# Patient Record
Sex: FEMALE | Race: WHITE | Employment: OTHER | URBAN - METROPOLITAN AREA
[De-identification: names, ages, dates, MRNs, and addresses within clinical notes are randomized per-mention and may not be internally consistent; named-entity substitution may affect disease eponyms.]

---

## 2018-02-09 ENCOUNTER — HOSPITAL ENCOUNTER (INPATIENT)
Age: 78
LOS: 3 days | Discharge: HOME | DRG: 191 | End: 2018-02-12
Attending: EMERGENCY MEDICINE | Admitting: INTERNAL MEDICINE
Payer: MEDICARE

## 2018-02-09 ENCOUNTER — APPOINTMENT (OUTPATIENT)
Dept: CT IMAGING | Age: 78
DRG: 191 | End: 2018-02-09
Attending: INTERNAL MEDICINE
Payer: MEDICARE

## 2018-02-09 ENCOUNTER — APPOINTMENT (OUTPATIENT)
Dept: GENERAL RADIOLOGY | Age: 78
DRG: 191 | End: 2018-02-09
Attending: EMERGENCY MEDICINE
Payer: MEDICARE

## 2018-02-09 DIAGNOSIS — J96.01 ACUTE HYPOXEMIC RESPIRATORY FAILURE (HCC): Primary | ICD-10-CM

## 2018-02-09 DIAGNOSIS — J44.1 COPD EXACERBATION (HCC): ICD-10-CM

## 2018-02-09 PROBLEM — E87.0 HYPERNATREMIA: Status: ACTIVE | Noted: 2018-02-09

## 2018-02-09 PROBLEM — R09.02 HYPOXIA: Status: ACTIVE | Noted: 2018-02-09

## 2018-02-09 PROBLEM — Z72.0 TOBACCO ABUSE: Status: ACTIVE | Noted: 2018-02-09

## 2018-02-09 LAB
ALBUMIN SERPL-MCNC: 2.8 G/DL (ref 3.5–5)
ALBUMIN/GLOB SERPL: 0.9 {RATIO} (ref 1.1–2.2)
ALP SERPL-CCNC: 90 U/L (ref 45–117)
ALT SERPL-CCNC: 37 U/L (ref 12–78)
ANION GAP SERPL CALC-SCNC: 1 MMOL/L (ref 5–15)
AST SERPL-CCNC: 46 U/L (ref 15–37)
ATRIAL RATE: 67 BPM
BASOPHILS # BLD: 0 K/UL (ref 0–0.1)
BASOPHILS NFR BLD: 1 % (ref 0–1)
BILIRUB SERPL-MCNC: 0.5 MG/DL (ref 0.2–1)
BUN SERPL-MCNC: 15 MG/DL (ref 6–20)
BUN/CREAT SERPL: 19 (ref 12–20)
CALCIUM SERPL-MCNC: 8.4 MG/DL (ref 8.5–10.1)
CALCULATED P AXIS, ECG09: 69 DEGREES
CALCULATED R AXIS, ECG10: 70 DEGREES
CALCULATED T AXIS, ECG11: 67 DEGREES
CHLORIDE SERPL-SCNC: 107 MMOL/L (ref 97–108)
CO2 SERPL-SCNC: 38 MMOL/L (ref 21–32)
CREAT SERPL-MCNC: 0.81 MG/DL (ref 0.55–1.02)
DIAGNOSIS, 93000: NORMAL
DIFFERENTIAL METHOD BLD: ABNORMAL
EOSINOPHIL # BLD: 0 K/UL (ref 0–0.4)
EOSINOPHIL NFR BLD: 1 % (ref 0–7)
ERYTHROCYTE [DISTWIDTH] IN BLOOD BY AUTOMATED COUNT: 11.9 % (ref 11.5–14.5)
GLOBULIN SER CALC-MCNC: 3.1 G/DL (ref 2–4)
GLUCOSE SERPL-MCNC: 97 MG/DL (ref 65–100)
HCT VFR BLD AUTO: 44 % (ref 35–47)
HGB BLD-MCNC: 14.4 G/DL (ref 11.5–16)
IMM GRANULOCYTES # BLD: 0 K/UL
IMM GRANULOCYTES NFR BLD AUTO: 0 %
LYMPHOCYTES # BLD: 1 K/UL (ref 0.8–3.5)
LYMPHOCYTES NFR BLD: 24 % (ref 12–49)
MCH RBC QN AUTO: 33.2 PG (ref 26–34)
MCHC RBC AUTO-ENTMCNC: 32.7 G/DL (ref 30–36.5)
MCV RBC AUTO: 101.4 FL (ref 80–99)
MONOCYTES # BLD: 0.7 K/UL (ref 0–1)
MONOCYTES NFR BLD: 17 % (ref 5–13)
NEUTS BAND NFR BLD MANUAL: 2 % (ref 0–6)
NEUTS SEG # BLD: 2.5 K/UL (ref 1.8–8)
NEUTS SEG NFR BLD: 55 % (ref 32–75)
NRBC # BLD: 0 K/UL (ref 0–0.01)
NRBC BLD-RTO: 0 PER 100 WBC
P-R INTERVAL, ECG05: 138 MS
PLATELET # BLD AUTO: 144 K/UL (ref 150–400)
PMV BLD AUTO: 10.2 FL (ref 8.9–12.9)
POTASSIUM SERPL-SCNC: 3.5 MMOL/L (ref 3.5–5.1)
PROT SERPL-MCNC: 5.9 G/DL (ref 6.4–8.2)
Q-T INTERVAL, ECG07: 394 MS
QRS DURATION, ECG06: 78 MS
QTC CALCULATION (BEZET), ECG08: 416 MS
RBC # BLD AUTO: 4.34 M/UL (ref 3.8–5.2)
RBC MORPH BLD: ABNORMAL
SODIUM SERPL-SCNC: 146 MMOL/L (ref 136–145)
TROPONIN I SERPL-MCNC: <0.04 NG/ML
VENTRICULAR RATE, ECG03: 67 BPM
WBC # BLD AUTO: 4.2 K/UL (ref 3.6–11)

## 2018-02-09 PROCEDURE — 71275 CT ANGIOGRAPHY CHEST: CPT

## 2018-02-09 PROCEDURE — 94640 AIRWAY INHALATION TREATMENT: CPT

## 2018-02-09 PROCEDURE — 74011000250 HC RX REV CODE- 250: Performed by: EMERGENCY MEDICINE

## 2018-02-09 PROCEDURE — 36415 COLL VENOUS BLD VENIPUNCTURE: CPT | Performed by: EMERGENCY MEDICINE

## 2018-02-09 PROCEDURE — 94761 N-INVAS EAR/PLS OXIMETRY MLT: CPT

## 2018-02-09 PROCEDURE — 74011000250 HC RX REV CODE- 250: Performed by: INTERNAL MEDICINE

## 2018-02-09 PROCEDURE — 74011250637 HC RX REV CODE- 250/637: Performed by: INTERNAL MEDICINE

## 2018-02-09 PROCEDURE — 65270000029 HC RM PRIVATE

## 2018-02-09 PROCEDURE — 77030013140 HC MSK NEB VYRM -A

## 2018-02-09 PROCEDURE — 74011000258 HC RX REV CODE- 258: Performed by: INTERNAL MEDICINE

## 2018-02-09 PROCEDURE — 84484 ASSAY OF TROPONIN QUANT: CPT | Performed by: EMERGENCY MEDICINE

## 2018-02-09 PROCEDURE — 74011250636 HC RX REV CODE- 250/636: Performed by: INTERNAL MEDICINE

## 2018-02-09 PROCEDURE — 74011636320 HC RX REV CODE- 636/320: Performed by: RADIOLOGY

## 2018-02-09 PROCEDURE — 93005 ELECTROCARDIOGRAM TRACING: CPT

## 2018-02-09 PROCEDURE — A9270 NON-COVERED ITEM OR SERVICE: HCPCS | Performed by: EMERGENCY MEDICINE

## 2018-02-09 PROCEDURE — 80053 COMPREHEN METABOLIC PANEL: CPT | Performed by: EMERGENCY MEDICINE

## 2018-02-09 PROCEDURE — 85025 COMPLETE CBC W/AUTO DIFF WBC: CPT | Performed by: EMERGENCY MEDICINE

## 2018-02-09 PROCEDURE — 99285 EMERGENCY DEPT VISIT HI MDM: CPT

## 2018-02-09 PROCEDURE — 71046 X-RAY EXAM CHEST 2 VIEWS: CPT

## 2018-02-09 PROCEDURE — 74011636637 HC RX REV CODE- 636/637: Performed by: EMERGENCY MEDICINE

## 2018-02-09 RX ORDER — SODIUM CHLORIDE 0.9 % (FLUSH) 0.9 %
5-10 SYRINGE (ML) INJECTION EVERY 8 HOURS
Status: DISCONTINUED | OUTPATIENT
Start: 2018-02-09 | End: 2018-02-12 | Stop reason: HOSPADM

## 2018-02-09 RX ORDER — ALBUTEROL SULFATE 90 UG/1
2 AEROSOL, METERED RESPIRATORY (INHALATION)
COMMUNITY

## 2018-02-09 RX ORDER — DOXYCYCLINE 100 MG/1
100 CAPSULE ORAL 2 TIMES DAILY
COMMUNITY
End: 2018-02-09

## 2018-02-09 RX ORDER — ACETAMINOPHEN 325 MG/1
650 TABLET ORAL
Status: DISCONTINUED | OUTPATIENT
Start: 2018-02-09 | End: 2018-02-12 | Stop reason: HOSPADM

## 2018-02-09 RX ORDER — IBUPROFEN 200 MG
1 TABLET ORAL EVERY 24 HOURS
Status: DISCONTINUED | OUTPATIENT
Start: 2018-02-09 | End: 2018-02-12 | Stop reason: HOSPADM

## 2018-02-09 RX ORDER — SODIUM CHLORIDE 0.9 % (FLUSH) 0.9 %
5-10 SYRINGE (ML) INJECTION AS NEEDED
Status: DISCONTINUED | OUTPATIENT
Start: 2018-02-09 | End: 2018-02-12 | Stop reason: HOSPADM

## 2018-02-09 RX ORDER — ENOXAPARIN SODIUM 100 MG/ML
40 INJECTION SUBCUTANEOUS EVERY 24 HOURS
Status: DISCONTINUED | OUTPATIENT
Start: 2018-02-09 | End: 2018-02-12 | Stop reason: HOSPADM

## 2018-02-09 RX ORDER — ARFORMOTEROL TARTRATE 15 UG/2ML
15 SOLUTION RESPIRATORY (INHALATION)
Status: DISCONTINUED | OUTPATIENT
Start: 2018-02-09 | End: 2018-02-12 | Stop reason: HOSPADM

## 2018-02-09 RX ORDER — LEVOFLOXACIN 500 MG/1
500 TABLET, FILM COATED ORAL ONCE
Status: COMPLETED | OUTPATIENT
Start: 2018-02-09 | End: 2018-02-09

## 2018-02-09 RX ORDER — ALBUTEROL SULFATE 0.83 MG/ML
2.5 SOLUTION RESPIRATORY (INHALATION)
Status: DISCONTINUED | OUTPATIENT
Start: 2018-02-09 | End: 2018-02-12 | Stop reason: HOSPADM

## 2018-02-09 RX ORDER — DEXTROSE MONOHYDRATE AND SODIUM CHLORIDE 5; .45 G/100ML; G/100ML
75 INJECTION, SOLUTION INTRAVENOUS CONTINUOUS
Status: DISCONTINUED | OUTPATIENT
Start: 2018-02-09 | End: 2018-02-10

## 2018-02-09 RX ORDER — DOXYCYCLINE 100 MG/1
100 TABLET ORAL 2 TIMES DAILY
COMMUNITY
End: 2018-02-12

## 2018-02-09 RX ORDER — ONDANSETRON 2 MG/ML
4 INJECTION INTRAMUSCULAR; INTRAVENOUS
Status: DISCONTINUED | OUTPATIENT
Start: 2018-02-09 | End: 2018-02-12 | Stop reason: HOSPADM

## 2018-02-09 RX ORDER — PREDNISONE 20 MG/1
40 TABLET ORAL
Status: COMPLETED | OUTPATIENT
Start: 2018-02-09 | End: 2018-02-09

## 2018-02-09 RX ORDER — BENZONATATE 100 MG/1
100 CAPSULE ORAL
COMMUNITY
End: 2019-03-21

## 2018-02-09 RX ORDER — BUDESONIDE 0.5 MG/2ML
500 INHALANT ORAL
Status: DISCONTINUED | OUTPATIENT
Start: 2018-02-09 | End: 2018-02-12 | Stop reason: HOSPADM

## 2018-02-09 RX ORDER — IPRATROPIUM BROMIDE AND ALBUTEROL SULFATE 2.5; .5 MG/3ML; MG/3ML
3 SOLUTION RESPIRATORY (INHALATION)
Status: DISCONTINUED | OUTPATIENT
Start: 2018-02-09 | End: 2018-02-12 | Stop reason: HOSPADM

## 2018-02-09 RX ORDER — LEVOFLOXACIN 250 MG/1
250 TABLET ORAL EVERY 24 HOURS
Status: DISCONTINUED | OUTPATIENT
Start: 2018-02-10 | End: 2018-02-12 | Stop reason: HOSPADM

## 2018-02-09 RX ADMIN — BUDESONIDE 500 MCG: 0.5 INHALANT RESPIRATORY (INHALATION) at 20:55

## 2018-02-09 RX ADMIN — ENOXAPARIN SODIUM 40 MG: 40 INJECTION SUBCUTANEOUS at 21:00

## 2018-02-09 RX ADMIN — IOPAMIDOL 65 ML: 755 INJECTION, SOLUTION INTRAVENOUS at 19:10

## 2018-02-09 RX ADMIN — ALBUTEROL SULFATE 1 DOSE: 2.5 SOLUTION RESPIRATORY (INHALATION) at 17:31

## 2018-02-09 RX ADMIN — ALBUTEROL SULFATE 1 DOSE: 2.5 SOLUTION RESPIRATORY (INHALATION) at 16:03

## 2018-02-09 RX ADMIN — LEVOFLOXACIN 500 MG: 500 TABLET, FILM COATED ORAL at 21:02

## 2018-02-09 RX ADMIN — ALBUTEROL SULFATE 1 DOSE: 2.5 SOLUTION RESPIRATORY (INHALATION) at 16:21

## 2018-02-09 RX ADMIN — METHYLPREDNISOLONE SODIUM SUCCINATE 60 MG: 125 INJECTION, POWDER, FOR SOLUTION INTRAMUSCULAR; INTRAVENOUS at 20:28

## 2018-02-09 RX ADMIN — PREDNISONE 40 MG: 20 TABLET ORAL at 16:03

## 2018-02-09 RX ADMIN — DEXTROSE MONOHYDRATE AND SODIUM CHLORIDE 75 ML/HR: 5; .45 INJECTION, SOLUTION INTRAVENOUS at 20:42

## 2018-02-09 RX ADMIN — ACETAMINOPHEN 650 MG: 325 TABLET ORAL at 22:46

## 2018-02-09 RX ADMIN — Medication 10 ML: at 22:00

## 2018-02-09 RX ADMIN — IPRATROPIUM BROMIDE AND ALBUTEROL SULFATE 3 ML: .5; 3 SOLUTION RESPIRATORY (INHALATION) at 20:55

## 2018-02-09 NOTE — H&P
Admission History and Physical      NAME:  Angeles Miramontes   :   1940   MRN:  [id number]     PCP:  Nataly Almanza MD     Date/Time:  2018           Assessment/Plan:       COPD exacerbation ([facility] (2018):  Diagnosed by PCP years ago, but has not need to use nebulizer at home. Likely exacerbation but not significantly improved with steroids, doxy, and nebs this week. With metabolic alkalosis suspect chronic CO2 retention. -- IV solumedrol   -- scheduled and prn nebs   -- add LABA/ICS   -- check respiratory viral panel   -- add levaquin for possible bronchitis    Hypoxia (2018): Due to above. However, was 96% on RA on Monday at [facility] and now worse despite treatment. -- wean oxygen as tolerated   -- check CTA chest    Hypernatremia (2018):  Likely from mild volume depletion. -- IVF with D51/2NS   -- repeat labs in AM    AST elevation:  No prior labs for comparison. -- repeat in AM    Thrombocytopenia:  Mild. -- repeat in AM    Tobacco abuse (2018):  Counseled patient on importance of tobacco cessation. Approximately 3 min spent in this discussion alone. -- nicotine patch while inpatient           Subjective:     CHIEF COMPLAINT:  Cough. HISTORY OF PRESENT ILLNESS:     Ms. Sarah Park is a 65 y.o.  female who is admitted with COPD exacerbation ([facility] Ms. Sarah Park presented to the Emergency Department today complaining of cough and generally feeling ill. Over this past weekend had cough and ill feeling. Was seen at [facility] and diagnosed with COPD exacerbation. Given nebs, doxycycline, and tessalon. However, has not had improvement. Has nonproductive cough. Sob with activity. No fever, chills. No n/v. No diarrhea. Not improved with neb use at home. A bit better after neb in ED. Went back to [facility] today and found to be hypoxic and sent to ED.       Past Medical History:   Diagnosis Date    Chronic obstructive pulmonary disease ([facility] History reviewed. No pertinent surgical history. Social History   Substance Use Topics    Smoking status: Current Every Day Smoker    Smokeless tobacco: Not on file    Alcohol use No        Family History   Problem Relation Age of Onset    Heart Disease Brother         Allergies   Allergen Reactions    Latex Hives    Pcn [Penicillins] Hives        Prior to Admission medications    Medication Sig Start Date End Date Taking? Authorizing Provider   benzonatate (TESSALON PERLES) 100 mg capsule Take 100 mg by mouth three (3) times daily as needed for Cough. Yes Phys Other, MD   albuterol (VENTOLIN HFA) 90 mcg/actuation inhaler Take 2 Puffs by inhalation every four (4) hours as needed for Wheezing. Yes Phys Other, MD   doxycycline (ADOXA) 100 mg tablet Take 100 mg by mouth two (2) times a day.    Yes Historical Provider         Review of Systems:  (bold if positive, if negative)    Gen:  fatigueEyes:  ENT:  CVS:  Pulm:  CoughdyspneaGI:    :    MS:  Skin:  Endo:    Hem:  Renal:    Neuro:            Objective:      VITALS:    Vital signs reviewed; most recent are:    Visit Vitals    /45    Pulse 99    Temp 98.7 °F (37.1 °C)    Resp 25    Ht 5' 1.5\" (1.562 m)    Wt 47.2 kg (104 lb)    SpO2 (!) 86%    BMI 19.33 kg/m2     SpO2 Readings from Last 6 Encounters:   02/09/18 (!) 86%    O2 Flow Rate (L/min): 2 l/min   No intake or output data in the 24 hours ending 02/09/18 1808         Exam:     Physical Exam:    Gen:  thin, in no acute distress  HEENT:  Pink conjunctivae, PERRL, hearing intact to voice, moist mucous membranes  Neck:  Supple  Resp:  No accessory muscle use, diminished bs b/l staci at bases, scant exp wheezes  Card:  No murmurs, normal S1, S2 without thrills or peripheral edema  Abd:  Soft, non-tender, non-distended, normoactive bowel sounds are present  Lymph:  No cervical adenopathy  Musc:  No cyanosis  Skin:  No rashes or ulcers, skin turgor is decreased  Neuro:  Cranial nerves 3-12 are grossly intact,  strength is 5/5 bilaterally, dorsi / plantarflexion strength is 5/5 bilaterally, follows commands appropriately  Psych:  Alert with good insight. Oriented to person, place, and time       Labs:    Recent Labs      02/09/18   1610   WBC  4.2   HGB  14.4   HCT  44.0   PLT  144*     Recent Labs      02/09/18   1610   NA  146*   K  3.5   CL  107   CO2  38*   GLU  97   BUN  15   CREA  0.81   CA  8.4*   ALB  2.8*   TBILI  0.5   SGOT  46*   ALT  37     No results found for: GLUCPOC  No results for input(s): PH, PCO2, PO2, HCO3, FIO2 in the last 72 hours. No results for input(s): INR in the last 72 hours. No lab exists for component: INREXT    Chest Xray:  No acute process.   EKG reviewed:   Initial EKG: No acute changes suggestive of ischemia    Surrogate decision maker:  spouse    Total time spent with patient: [address] [facility] discussed with: Patient and Family    Discussed:  Care Plan    Prophylaxis:  Lovenox    Probable Disposition:  Home w/Family           ___________________________________________________    Attending Physician: Andrew Rees MD

## 2018-02-09 NOTE — ED TRIAGE NOTES
Monday started feeling bad, shortness of breath. [facility] and started on medications, didn't feel she was significantly better today so went back to [facility] and 02 sat was 87% on room air. 02 applied and EMS called to bring her to ER. Saline lock left forearm. 02 @ 4L/NC by EMS. 96%Sp 02. Patient has COPD, congested cough. Denies febrile illness this week. 02 reduced to 2L/NC upon arrival here.

## 2018-02-09 NOTE — PROGRESS NOTES
[facility]: MED RECONCILIATION    Comments/Recommendations:    Patient alert and oriented for medication reconciliation.  Patient given prescriptions for albuterol inhaler, Tessalon pearls and doxycyline 100mg BID on Monday at [facility]. Patient on day 4/10 of doxycyline course.  Patient states she also takes a few different vitamins/supplements at home but cannot name them. She states she has not taken any of them since starting doxycyline.  Confirmed allergies and updated pharmacy. Medications added:     · none    Medications removed:    · Decadron    Medications adjusted:    · none    Information obtained from: patient, Rx query     Allergies: Latex and Pcn [penicillins]    Prior to Admission Medications:     Medication Documentation Review Audit       Reviewed by Cody Franklin (Pharmacist) on 02/09/18 at 3672         Medication Sig Documenting Provider Last Dose Status Taking? albuterol (VENTOLIN HFA) 90 mcg/actuation inhaler Take 2 Puffs by inhalation every four (4) hours as needed for Wheezing. Eligio Valiente MD 2/9/2018 AM Active Yes    benzonatate (TESSALON PERLES) 100 mg capsule Take 100 mg by mouth three (3) times daily as needed for Cough. Eligio Valiente MD 2/9/2018 AM Active Yes    doxycycline (ADOXA) 100 mg tablet Take 100 mg by mouth two (2) times a day. Historical Provider 2/9/2018 AM Active Yes             Med Note (CODY FRANKLIN Feb 9, 2018  5:52 PM): Started on 2/6/18, prescribed for 10 days, on day 4/10.                      Thank Sadia Galeana, PharmD   Contact: [phone]

## 2018-02-09 NOTE — PROGRESS NOTES
[facility] Dosing Services: Antimicrobial Stewardship Progress Note    Levofloxacin - automatic dosage adjustment per P&T renal protocol  Pharmacist reviewed antibiotic appropriateness for 65year old , female  for indication of upper respiratory infection  Day of Therapy: 1    Non-Kinetic Antimicrobial Dosing:   Assessment/Plan: MD ordered Levofloxacin 500 mg PO daily. Patient's renal functin is 42.7 mL/min. OLevofloxacin changed to 500 mg PO x 1 dose today, then Levofloxacin 250 mg PO every 24 hours beginning tomorrow, 2/10/2018. Serum Creatinine     Lab Results   Component Value Date/Time    Creatinine 0.81 02/09/2018 04:10 PM    Creatinine (POC) 1.2 02/02/2012 09:29 PM       Creatinine Clearance Estimated Creatinine Clearance: 42.7 mL/min (based on Cr of 0.81).      Temp   98.7 °F (37.1 °C)    WBC   Lab Results   Component Value Date/Time    WBC 4.2 02/09/2018 04:10 PM       H/H   Lab Results   Component Value Date/Time    HGB 14.4 02/09/2018 04:10 PM        Platelets   Lab Results   Component Value Date/Time    PLATELET 772 (L) 51/02/2701 04:10 PM        Jose Pulliam, Pharmacist

## 2018-02-09 NOTE — ED PROVIDER NOTES
HPI Comments: 65 y.o. female with past medical history significant for COPD and emphysema who presents from home with chief complaint of cough. Patient states onset of a persistent severe dry cough over the last week that has remained relatively consistent. Patient mentions accompanying shortness of breath and a loss of appetite. Patient admits she has lost 5-6 pounds within the last week since she is just not eating. Patient did have a protein drink earlier today but denies eating anything else. Patient states she has been using 2 puffs of albuterol around 3 times daily with minimal relief. Patient denies having a nebulizer machine at home or filling script for duoneb. Patient states hx of the present sx around 1 year ago when she was given an inhaler. Patient denies fever, vomiting, diarrhea, and receiving a flu shot this year. There are no other acute medical concerns at this time. Old Chart Review: Per note, patient was evaluated at [facility] on 02/06/18 for similar sx and was given Decadron 10 mg PO in the office. She had a negative chest x-ray in the office at the time. She was started on Doxycycline 100 mg, Duoneb, Tessalon Perles, and albuterol. She returned to the office today; however, she was sent to the ED for a low O2 saturation of 87%. Social hx: Tobacco Use: Yes (1/2 pack per day), Alcohol Use: No, Drug Use: No    PCP: David Salazar MD    Note written by Vern Castillo, as dictated by Monica Johnson DO 3:54 PM      The history is provided by the patient, medical records and the spouse. Past Medical History:   Diagnosis Date    Chronic obstructive pulmonary disease ([facility]        History reviewed. No pertinent surgical history. History reviewed. No pertinent family history. Social History     Social History    Marital status:      Spouse name: N/A    Number of children: N/A    Years of education: N/A     Occupational History    Not on file. Social History Main Topics    Smoking status: Current Every Day Smoker    Smokeless tobacco: Not on file    Alcohol use No    Drug use: No    Sexual activity: Not on file     Other Topics Concern    Not on file     Social History Narrative    No narrative on file         ALLERGIES: Latex and Pcn [penicillins]    Review of Systems   Constitutional: Positive for appetite change and unexpected weight change. Negative for fever. HENT: Positive for congestion. Respiratory: Positive for cough and shortness of breath. Gastrointestinal: Negative for diarrhea, nausea and vomiting. All other systems reviewed and are negative. Vitals:    02/09/18 1536 02/09/18 1545   BP: 149/57 133/60   Pulse: 66 72   Resp: 24 25   Temp: 98.7 °F (37.1 °C)    SpO2: 96% 95%   Weight: 47.2 kg (104 lb)    Height: 5' 1.5\" (1.562 m)             Physical Exam   Constitutional: No distress. Frail, thin, elderly. HENT:   Head: Normocephalic. Nose: Nose normal.   Mouth/Throat: Oropharynx is clear and moist.   Eyes: Conjunctivae are normal. Pupils are equal, round, and reactive to light. Neck: No tracheal deviation present. Cardiovascular: Normal rate, regular rhythm, normal heart sounds and intact distal pulses. Pulmonary/Chest: She is in respiratory distress (mild). She has no wheezes. She has no rales. Diminished diffusely   Abdominal: Soft. She exhibits no distension. There is no tenderness. There is no rebound and no guarding. Musculoskeletal: She exhibits no edema. Neurological: She is alert. Skin: Skin is warm and dry. She is not diaphoretic. Psychiatric: She has a normal mood and affect. [facility]      ED Course       Procedures    ED EKG interpretation:  Rhythm: normal sinus rhythm; and regular . Rate (approx.): 67; Axis: normal; ST/T wave: normal; No acute ST changes.   Note written by Vern Rodriguez, as dictated by Harsh Dubose DO 3:47 PM    5:26 PM  Reviewed the patient's chest x-ray. '    5:30 PM  Patient's oxygen saturation was 88% on room air. Will start 3rd nebulizer treatment. She will need to be admitted to the hospitalist service for acute hypoxic respiratory failure. CONSULT NOTE:  5:35 PM Gia Weston DO spoke with Dr. Sade Orantes, Consult for Hospitalist.  Discussed available diagnostic tests and clinical findings. Provider is in agreement with care plans as outlined. Provider will evaluate the patient for admission to the hospital.          Recent Results (from the past 12 hour(s))   EKG, 12 LEAD, INITIAL    Collection Time: 02/09/18  3:47 PM   Result Value Ref Range    Ventricular Rate 67 BPM    Atrial Rate 67 BPM    P-R Interval 138 ms    QRS Duration 78 ms    Q-T Interval 394 ms    QTC Calculation (Bezet) 416 ms    Calculated P Axis 69 degrees    Calculated R Axis 70 degrees    Calculated T Axis 67 degrees    Diagnosis       Normal sinus rhythm  Possible Left atrial enlargement  Borderline ECG  No previous ECGs available     CBC WITH AUTOMATED DIFF    Collection Time: 02/09/18  4:10 PM   Result Value Ref Range    WBC 4.2 3.6 - 11.0 K/uL    RBC 4.34 3.80 - 5.20 M/uL    HGB 14.4 11.5 - 16.0 g/dL    HCT 44.0 35.0 - 47.0 %    .4 (H) 80.0 - 99.0 FL    MCH 33.2 26.0 - 34.0 PG    MCHC 32.7 30.0 - 36.5 g/dL    RDW 11.9 11.5 - 14.5 %    PLATELET 209 (L) 887 - 400 K/uL    MPV 10.2 8.9 - 12.9 FL    NRBC 0.0 0  WBC    ABSOLUTE NRBC 0.00 0.00 - 0.01 K/uL    NEUTROPHILS 55 32 - 75 %    BAND NEUTROPHILS 2 0 - 6 %    LYMPHOCYTES 24 12 - 49 %    MONOCYTES 17 (H) 5 - 13 %    EOSINOPHILS 1 0 - 7 %    BASOPHILS 1 0 - 1 %    IMMATURE GRANULOCYTES 0 %    ABS. NEUTROPHILS 2.5 1.8 - 8.0 K/UL    ABS. LYMPHOCYTES 1.0 0.8 - 3.5 K/UL    ABS. MONOCYTES 0.7 0.0 - 1.0 K/UL    ABS. EOSINOPHILS 0.0 0.0 - 0.4 K/UL    ABS. BASOPHILS 0.0 0.0 - 0.1 K/UL    ABS. IMM.  GRANS. 0.0 K/UL    DF MANUAL      RBC COMMENTS NORMOCYTIC, NORMOCHROMIC     METABOLIC PANEL, COMPREHENSIVE    Collection Time: 02/09/18  4:10 PM   Result Value Ref Range    Sodium 146 (H) 136 - 145 mmol/L    Potassium 3.5 3.5 - 5.1 mmol/L    Chloride 107 97 - 108 mmol/L    CO2 38 (H) 21 - 32 mmol/L    Anion gap 1 (L) 5 - 15 mmol/L    Glucose 97 65 - 100 mg/dL    BUN 15 6 - 20 MG/DL    Creatinine 0.81 0.55 - 1.02 MG/DL    BUN/Creatinine ratio 19 12 - 20      GFR est AA >60 >60 ml/min/1.73m2    GFR est non-AA >60 >60 ml/min/1.73m2    Calcium 8.4 (L) 8.5 - 10.1 MG/DL    Bilirubin, total 0.5 0.2 - 1.0 MG/DL    ALT (SGPT) 37 12 - 78 U/L    AST (SGOT) 46 (H) 15 - 37 U/L    Alk.  phosphatase 90 45 - 117 U/L    Protein, total 5.9 (L) 6.4 - 8.2 g/dL    Albumin 2.8 (L) 3.5 - 5.0 g/dL    Globulin 3.1 2.0 - 4.0 g/dL    A-G Ratio 0.9 (L) 1.1 - 2.2     TROPONIN I    Collection Time: 02/09/18  4:10 PM   Result Value Ref Range    Troponin-I, Qt. <0.04 <0.05 ng/mL

## 2018-02-09 NOTE — ED NOTES
TRANSFER - OUT REPORT:    Verbal report given to Gisela Sahu RN (name) on Brenda Dumont  being transferred to 5th floor medical(unit) for routine progression of care       Report consisted of patients Situation, Background, Assessment and   Recommendations(SBAR). Information from the following report(s) SBAR, ED Summary and MAR was reviewed with the receiving nurse. Lines:   Peripheral IV 02/09/18 Right Antecubital (Active)   Site Assessment Clean, dry, & intact 2/9/2018  3:41 PM   Phlebitis Assessment 0 2/9/2018  3:41 PM   Infiltration Assessment 0 2/9/2018  3:41 PM   Dressing Status Clean, dry, & intact 2/9/2018  3:41 PM   Hub Color/Line Status Green 2/9/2018  3:41 PM   Alcohol Cap Used Yes 2/9/2018  3:41 PM        Opportunity for questions and clarification was provided.       Patient transported with:   O2 @ 2L/NC liters

## 2018-02-09 NOTE — IP AVS SNAPSHOT
[facility] 
 
 
 [address] [facility] [address] 1007 [facility] 
[phone] Patient: Jennifer Watters MRN: [id number] :1940 About your hospitalization You were admitted on:  2018 You last received care in the:  [facility]  MED SURG 2 You were discharged on:  2018 Why you were hospitalized Your primary diagnosis was:  Copd Exacerbation (Hcc) Your diagnoses also included: Tobacco Abuse, Hypoxia, Hypernatremia, Leukopenia, Hypokalemia, Thrombocytopenia (Hcc) Follow-up Information Follow up With Details Comments Contact Info Clarene Simmonds, MD Go on 2/15/2018 For hospital follow up appointment at [place] [address] [address] 1007 [facility] 
[phone] Discharge Orders None A check willian indicates which time of day the medication should be taken. My Medications START taking these medications Instructions Each Dose to Equal  
 Morning Noon Evening Bedtime  
 fluticasone-salmeterol 250-50 mcg/dose diskus inhaler Commonly known as:  ADVAIR DISKUS Your last dose was: Your next dose is: Take 1 Puff by inhalation every twelve (12) hours for 30 days. 1 Puff  
    
   
   
   
  
 levoFLOXacin 250 mg tablet Commonly known as:  Christina Lord Your last dose was: Your next dose is: Take 1 Tab by mouth every twenty-four (24) hours for 3 days. 250 mg  
    
   
   
   
  
 nicotine 14 mg/24 hr patch Commonly known as:  Fred Nguyen Your last dose was: Your next dose is:    
   
   
 1 Patch by TransDERmal route every twenty-four (24) hours for 30 days. 1 Patch  
    
   
   
   
  
 predniSONE 10 mg tablet Commonly known as:  Che Manning Your last dose was:     
   
Your next dose is:    
   
   
 Prednisone 10mg tabs: 4 tabs (40 mg) daily x 3 days, then 3 tabs (30 mg) daily x 3 days, then 2 tabs (20 mg) daily x 3 days, then 1 tab   (10 mg) daily until gone. Dispense 30 tabs CONTINUE taking these medications Instructions Each Dose to Equal  
 Morning Noon Evening Bedtime TESSALON PERLES 100 mg capsule Generic drug:  benzonatate Your last dose was: Your next dose is: Take 100 mg by mouth three (3) times daily as needed for Cough. 100 mg VENTOLIN HFA 90 mcg/actuation inhaler Generic drug:  albuterol Your last dose was: Your next dose is: Take 2 Puffs by inhalation every four (4) hours as needed for Wheezing. 2 Puff STOP taking these medications   
 doxycycline 100 mg tablet Commonly known as:  ADOXA Where to Get Your Medications Information on where to get these meds will be given to you by the nurse or doctor. ! Ask your nurse or doctor about these medications  
  fluticasone-salmeterol 250-50 mcg/dose diskus inhaler  
 levoFLOXacin 250 mg tablet  
 nicotine 14 mg/24 hr patch  
 predniSONE 10 mg tablet Discharge Instructions HOSPITALIST DISCHARGE INSTRUCTIONS 
 
NAME:             Dagoberto Nesbitt :  1940 MRN:  [id number] Date:     2018 ADMIT DATE: 2018 DISCHARGE DATE: 2018 PRINCIPAL ADMITTING DIAGNOSIS: 
[facility] DISCHARGE DIAGNOSES: 
Principal Problem: COPD exacerbation ([facility] (2018) Tobacco abuse (2018) Hypoxia (2018) Leukopenia (2/10/2018) Thrombocytopenia ([facility] (2/10/2018) MEDICATIONS: 
 
· It is important that medications are taken exactly as they are prescribed on the discharge medication instructions and keep them your  in the bottles provided by the pharmacist.  
· Keep a list of the medication names, dosages, and times to be taken at all times. · Do not take other medications without consulting your doctor. Recommended diet:  Regular Diet Recommended activity: Activity as tolerated Post discharge care: 
 
Notify follow up health care provider or return to the emergency department if you cannot get hold of your doctor if you feel worse or experience symptoms similar to those that brought you to hospital 
 
Follow-up Information Follow up With Details Comments Contact Info Case Champion MD Schedule an appointment as soon as possible for a visit for review [address] [place] [address] 201 70 [facility] 
[phone] Information obtained by : 
I understand that if any problems occur once I am at home I am to contact my physician and I understand and acknowledge receipt of the instructions indicated above. Physician's or R.N.'s Signature                                                                  Date/Time Patient or Representative Signature                                                          Date/Time Taykey Announcement We are excited to announce that we are making your provider's discharge notes available to you in Taykey. You will see these notes when they are completed and signed by the physician that discharged you from your recent hospital stay. If you have any questions or concerns about any information you see in Taykey, please call the Health Information Department where you were seen or reach out to your Primary Care Provider for more information about your plan of care. Introducing [facility]!    
 [address] introduces Taykey patient portal. Now you can access parts of your medical record, email your doctor's office, and request medication refills online. 1. In your internet browser, go to [email or web address] 2. Click on the First Time User? Click Here link in the Sign In box. You will see the New Member Sign Up page. 3. Enter your [employer] Access Code exactly as it appears below. You will not need to use this code after youve completed the sign-up process. If you do not sign up before the expiration date, you must request a new code. · [employer] Access Code: [email or web address] Expires: 5/13/2018 10:44 AM 
 
4. Enter the last four digits of your Social Security Number (xxxx) and Date of Birth (mm/dd/yyyy) as indicated and click Submit. You will be taken to the next sign-up page. 5. Create a [employer] ID. This will be your [employer] login ID and cannot be changed, so think of one that is secure and easy to remember. 6. Create a [employer] password. You can change your password at any time. 7. Enter your Password Reset Question and Answer. This can be used at a later time if you forget your password. 8. Enter your e-mail address. You will receive e-mail notification when new information is available in [address] 9. Click Sign Up. You can now view and download portions of your medical record. 10. Click the Download Summary menu link to download a portable copy of your medical information. If you have questions, please visit the Frequently Asked Questions section of the [employer] website. Remember, [employer] is NOT to be used for urgent needs. For medical emergencies, dial 911. Now available from your iPhone and Android! Providers Seen During Your Hospitalization Provider Specialty Primary office phone Christina Olvera DO Emergency Medicine [phone] Miguel Angel Hutchinson MD Internal Medicine [phone] Isabel Howe MD Internal Medicine [phone] Tammie Bryson MD Internal Medicine [phone] Your Primary Care Physician (PCP) Primary Care Physician Office Phone Office Fax [address] [phone] You are allergic to the following Allergen Reactions Latex Hives Pcn (Penicillins) Hives Recent Documentation Height Weight BMI OB Status Smoking Status 1.562 m 47.2 kg 19.33 kg/m2 Postmenopausal Current Every Day Smoker Emergency Contacts Name Discharge Info Relation Home Work Mobile [address] Dannielle MACK CAREGIVER [3] Other Relative [6] [phone] [facility] HOSPITAL DISCHARGE CAREGIVER [3] Spouse [3]   [phone] Patient Belongings The following personal items are in your possession at time of discharge: 
  Dental Appliances: Uppers, Lowers, With patient  Visual Aid: Glasses      Home Medications: None   Jewelry: Ring, Watch  Clothing: None    Other Valuables: Cell Phone, At bedside Please provide this summary of care documentation to your next provider. Signatures-by signing, you are acknowledging that this After Visit Summary has been reviewed with you and you have received a copy. Patient Signature:  ____________________________________________________________ Date:  ____________________________________________________________  
  
Marlee Larsen Provider Signature:  ____________________________________________________________ Date:  ____________________________________________________________

## 2018-02-10 PROBLEM — Z72.0 TOBACCO ABUSE: Chronic | Status: ACTIVE | Noted: 2018-02-09

## 2018-02-10 PROBLEM — D72.819 LEUKOPENIA: Status: ACTIVE | Noted: 2018-02-10

## 2018-02-10 PROBLEM — E87.6 HYPOKALEMIA: Status: ACTIVE | Noted: 2018-02-10

## 2018-02-10 PROBLEM — D69.6 THROMBOCYTOPENIA (HCC): Status: ACTIVE | Noted: 2018-02-10

## 2018-02-10 LAB
ALBUMIN SERPL-MCNC: 2.8 G/DL (ref 3.5–5)
ALBUMIN/GLOB SERPL: 0.8 {RATIO} (ref 1.1–2.2)
ALP SERPL-CCNC: 94 U/L (ref 45–117)
ALT SERPL-CCNC: 33 U/L (ref 12–78)
ANION GAP SERPL CALC-SCNC: 10 MMOL/L (ref 5–15)
AST SERPL-CCNC: 35 U/L (ref 15–37)
BILIRUB SERPL-MCNC: 0.3 MG/DL (ref 0.2–1)
BUN SERPL-MCNC: 17 MG/DL (ref 6–20)
BUN/CREAT SERPL: 16 (ref 12–20)
CALCIUM SERPL-MCNC: 8.2 MG/DL (ref 8.5–10.1)
CHLORIDE SERPL-SCNC: 105 MMOL/L (ref 97–108)
CO2 SERPL-SCNC: 30 MMOL/L (ref 21–32)
CREAT SERPL-MCNC: 1.09 MG/DL (ref 0.55–1.02)
ERYTHROCYTE [DISTWIDTH] IN BLOOD BY AUTOMATED COUNT: 12.1 % (ref 11.5–14.5)
GLOBULIN SER CALC-MCNC: 3.3 G/DL (ref 2–4)
GLUCOSE SERPL-MCNC: 334 MG/DL (ref 65–100)
HCT VFR BLD AUTO: 41.4 % (ref 35–47)
HGB BLD-MCNC: 13.8 G/DL (ref 11.5–16)
MAGNESIUM SERPL-MCNC: 1.8 MG/DL (ref 1.6–2.4)
MCH RBC QN AUTO: 33.8 PG (ref 26–34)
MCHC RBC AUTO-ENTMCNC: 33.3 G/DL (ref 30–36.5)
MCV RBC AUTO: 101.5 FL (ref 80–99)
NRBC # BLD: 0 K/UL (ref 0–0.01)
NRBC BLD-RTO: 0 PER 100 WBC
PLATELET # BLD AUTO: 145 K/UL (ref 150–400)
PMV BLD AUTO: 10.1 FL (ref 8.9–12.9)
POTASSIUM SERPL-SCNC: 3.4 MMOL/L (ref 3.5–5.1)
PROT SERPL-MCNC: 6.1 G/DL (ref 6.4–8.2)
RBC # BLD AUTO: 4.08 M/UL (ref 3.8–5.2)
SODIUM SERPL-SCNC: 145 MMOL/L (ref 136–145)
WBC # BLD AUTO: 1.6 K/UL (ref 3.6–11)

## 2018-02-10 PROCEDURE — 74011000250 HC RX REV CODE- 250: Performed by: INTERNAL MEDICINE

## 2018-02-10 PROCEDURE — 65270000029 HC RM PRIVATE

## 2018-02-10 PROCEDURE — 83735 ASSAY OF MAGNESIUM: CPT | Performed by: INTERNAL MEDICINE

## 2018-02-10 PROCEDURE — 85027 COMPLETE CBC AUTOMATED: CPT | Performed by: INTERNAL MEDICINE

## 2018-02-10 PROCEDURE — 74011250637 HC RX REV CODE- 250/637: Performed by: INTERNAL MEDICINE

## 2018-02-10 PROCEDURE — 74011250636 HC RX REV CODE- 250/636: Performed by: INTERNAL MEDICINE

## 2018-02-10 PROCEDURE — 87633 RESP VIRUS 12-25 TARGETS: CPT | Performed by: INTERNAL MEDICINE

## 2018-02-10 PROCEDURE — 94640 AIRWAY INHALATION TREATMENT: CPT

## 2018-02-10 PROCEDURE — 80053 COMPREHEN METABOLIC PANEL: CPT | Performed by: INTERNAL MEDICINE

## 2018-02-10 PROCEDURE — 77010033678 HC OXYGEN DAILY

## 2018-02-10 PROCEDURE — 36415 COLL VENOUS BLD VENIPUNCTURE: CPT | Performed by: INTERNAL MEDICINE

## 2018-02-10 RX ORDER — POTASSIUM CHLORIDE 750 MG/1
40 TABLET, FILM COATED, EXTENDED RELEASE ORAL EVERY 4 HOURS
Status: COMPLETED | OUTPATIENT
Start: 2018-02-10 | End: 2018-02-10

## 2018-02-10 RX ADMIN — METHYLPREDNISOLONE SODIUM SUCCINATE 60 MG: 125 INJECTION, POWDER, FOR SOLUTION INTRAMUSCULAR; INTRAVENOUS at 10:10

## 2018-02-10 RX ADMIN — METHYLPREDNISOLONE SODIUM SUCCINATE 60 MG: 125 INJECTION, POWDER, FOR SOLUTION INTRAMUSCULAR; INTRAVENOUS at 15:00

## 2018-02-10 RX ADMIN — Medication 10 ML: at 03:19

## 2018-02-10 RX ADMIN — METHYLPREDNISOLONE SODIUM SUCCINATE 60 MG: 125 INJECTION, POWDER, FOR SOLUTION INTRAMUSCULAR; INTRAVENOUS at 21:39

## 2018-02-10 RX ADMIN — LEVOFLOXACIN 250 MG: 250 TABLET, FILM COATED ORAL at 18:00

## 2018-02-10 RX ADMIN — IPRATROPIUM BROMIDE AND ALBUTEROL SULFATE 3 ML: .5; 3 SOLUTION RESPIRATORY (INHALATION) at 16:34

## 2018-02-10 RX ADMIN — Medication 10 ML: at 13:48

## 2018-02-10 RX ADMIN — ENOXAPARIN SODIUM 40 MG: 40 INJECTION SUBCUTANEOUS at 21:10

## 2018-02-10 RX ADMIN — POTASSIUM CHLORIDE 40 MEQ: 750 TABLET, EXTENDED RELEASE ORAL at 13:47

## 2018-02-10 RX ADMIN — IPRATROPIUM BROMIDE AND ALBUTEROL SULFATE 3 ML: .5; 3 SOLUTION RESPIRATORY (INHALATION) at 12:36

## 2018-02-10 RX ADMIN — POTASSIUM CHLORIDE 40 MEQ: 750 TABLET, EXTENDED RELEASE ORAL at 10:10

## 2018-02-10 RX ADMIN — BUDESONIDE 500 MCG: 0.5 INHALANT RESPIRATORY (INHALATION) at 09:15

## 2018-02-10 RX ADMIN — METHYLPREDNISOLONE SODIUM SUCCINATE 60 MG: 125 INJECTION, POWDER, FOR SOLUTION INTRAMUSCULAR; INTRAVENOUS at 03:01

## 2018-02-10 RX ADMIN — IPRATROPIUM BROMIDE AND ALBUTEROL SULFATE 3 ML: .5; 3 SOLUTION RESPIRATORY (INHALATION) at 20:53

## 2018-02-10 RX ADMIN — BUDESONIDE 500 MCG: 0.5 INHALANT RESPIRATORY (INHALATION) at 20:52

## 2018-02-10 RX ADMIN — Medication 10 ML: at 21:40

## 2018-02-10 RX ADMIN — IPRATROPIUM BROMIDE AND ALBUTEROL SULFATE 3 ML: .5; 3 SOLUTION RESPIRATORY (INHALATION) at 09:15

## 2018-02-10 NOTE — PROGRESS NOTES
Guilherme I[facility] [place] [address]  [address]  [phone]      Medical Progress Note      NAME: Mina Luevano   :  1940  MRM:  [id number]    Date/Time: 2/10/2018  9:14 AM         Subjective:     Chief Complaint:  SOB: mild this AM, improved with treatments overnight, constant, worse with exertion,     ROS:  (bold if positive, if negative)                SOB/GORE        Tolerating Diet          Objective:       Vitals:          Last 24hrs VS reviewed since prior progress note.  Most recent are:    Visit Vitals    /65 (BP 1 Location: Left arm, BP Patient Position: At rest)    Pulse 80    Temp 98 °F (36.7 °C)    Resp 21    Ht 5' 1.5\" (1.562 m)    Wt 47.2 kg (104 lb)    SpO2 94%    BMI 19.33 kg/m2     SpO2 Readings from Last 6 Encounters:   02/10/18 94%    O2 Flow Rate (L/min): 2.5 l/min   No intake or output data in the 24 hours ending 02/10/18 0914       Exam:     Physical Exam:    Gen:  Well-developed, thin, frail, elderly, in no acute distress  HEENT:  Pink conjunctivae, PERRL, hearing intact to voice, moist mucous membranes  Neck:  Supple, without masses, thyroid non-tender  Resp:  No accessory muscle use, diminished throughout, slightly prolonged expiratory phase, few wheezes and rhonchi  Card:  No murmurs, normal S1, S2 without thrills, bruits or peripheral edema  Abd:  Soft, non-tender, non-distended, normoactive bowel sounds are present, no palpable organomegaly and no detectable hernias  Lymph:  No cervical or inguinal adenopathy  Musc:  No cyanosis or clubbing  Skin:  No rashes or ulcers, skin turgor is good  Neuro:  Cranial nerves are grossly intact, no focal motor weakness, follows commands appropriately  Psych:  Good insight, oriented to person, place and time, alert       Medications Reviewed: (see below)    Lab Data Reviewed: (see below)    ______________________________________________________________________    Medications: Current Facility-Administered Medications   Medication Dose Route Frequency    potassium chloride SR (KLOR-CON 10) tablet 40 mEq  40 mEq Oral Q4H    sodium chloride (NS) flush 5-10 mL  5-10 mL IntraVENous Q8H    sodium chloride (NS) flush 5-10 mL  5-10 mL IntraVENous PRN    acetaminophen (TYLENOL) tablet 650 mg  650 mg Oral Q4H PRN    ondansetron (ZOFRAN) injection 4 mg  4 mg IntraVENous Q4H PRN    enoxaparin (LOVENOX) injection 40 mg  40 mg SubCUTAneous Q24H    nicotine (NICODERM CQ) 14 mg/24 hr patch 1 Patch  1 Patch TransDERmal Q24H    albuterol-ipratropium (DUO-NEB) 2.5 MG-0.5 MG/3 ML  3 mL Nebulization Q4HWA RT    albuterol (PROVENTIL VENTOLIN) nebulizer solution 2.5 mg  2.5 mg Nebulization Q2H PRN    methylPREDNISolone (PF) (SOLU-MEDROL) injection 60 mg  60 mg IntraVENous Q6H    budesonide (PULMICORT) 500 mcg/2 ml nebulizer suspension  500 mcg Nebulization BID RT    arformoterol (BROVANA) neb solution 15 mcg  15 mcg Nebulization BID RT    levoFLOXacin (LEVAQUIN) tablet 250 mg  250 mg Oral Q24H            Lab Review:     Recent Labs      02/10/18   0259  02/09/18   1610   WBC  1.6*  4.2   HGB  13.8  14.4   HCT  41.4  44.0   PLT  145*  144*     Recent Labs      02/10/18   0259  02/09/18   1610   NA  145  146*   K  3.4*  3.5   CL  105  107   CO2  30  38*   GLU  334*  97   BUN  17  15   CREA  1.09*  0.81   CA  8.2*  8.4*   MG  1.8   --    ALB  2.8*  2.8*   TBILI  0.3  0.5   SGOT  35  46*   ALT  33  37     No results found for: GLUCPOC  No results for input(s): PH, PCO2, PO2, HCO3, FIO2 in the last 72 hours. No results for input(s): INR in the last 72 hours.     No lab exists for component: INREXT  No results found for: SDES  No results found for: CULT         Assessment:     Principal Problem:    COPD exacerbation ([address] (2/9/2018)    Active Problems:    Tobacco abuse (2/9/2018)      Hypoxia (2/9/2018)      Hypernatremia (2/9/2018)      Leukopenia (2/10/2018)      Hypokalemia (2/10/2018) Thrombocytopenia ([facility] (2/10/2018)           Plan:     Principal Problem:    COPD exacerbation ([address] (2/9/2018)   - continue nebs, steroids, levofloxacin   - viral panel ordered but not done, I have reordered it as well as a sputum culture    Active Problems:    Tobacco abuse (2/9/2018)   - counseled on cessation      Hypoxia (2/9/2018)   - CTA visualized by me, no PE      Hypernatremia (2/9/2018)   - resolved on IV fluids      Leukopenia (2/10/2018)   - WBC down this AM, suspect underlying viral infection   - follow on steroids and antibiotics   - if drops further may need to consult Hem/Onc      Hypokalemia (2/10/2018)   - replete and follow      Thrombocytopenia ([facility] (2/10/2018)   - very mild, follow on enoxaparin      Total time spent with patient: 35 minutes                  Care Plan discussed with: Patient and Nursing Staff    Discussed:  Code Status, Care Plan and D/C Planning    Prophylaxis:  Lovenox    Disposition:  Home w/Family           ___________________________________________________    Attending Physician: Daniel Newell MD

## 2018-02-10 NOTE — PROGRESS NOTES
Bedside and Verbal shift change report given to Huy Ballard RN (oncoming nurse) by Marjorie Sheehan RN (offgoing nurse). Report included the following information SBAR, Kardex, MAR and Recent Results.

## 2018-02-11 PROBLEM — E87.6 HYPOKALEMIA: Status: RESOLVED | Noted: 2018-02-10 | Resolved: 2018-02-11

## 2018-02-11 PROBLEM — E87.0 HYPERNATREMIA: Status: RESOLVED | Noted: 2018-02-09 | Resolved: 2018-02-11

## 2018-02-11 LAB
ANION GAP SERPL CALC-SCNC: 7 MMOL/L (ref 5–15)
B PERT DNA SPEC QL NAA+PROBE: NOT DETECTED
BASOPHILS # BLD: 0 K/UL (ref 0–0.1)
BASOPHILS NFR BLD: 0 % (ref 0–1)
BUN SERPL-MCNC: 23 MG/DL (ref 6–20)
BUN/CREAT SERPL: 26 (ref 12–20)
C PNEUM DNA SPEC QL NAA+PROBE: NOT DETECTED
CALCIUM SERPL-MCNC: 9.5 MG/DL (ref 8.5–10.1)
CHLORIDE SERPL-SCNC: 109 MMOL/L (ref 97–108)
CO2 SERPL-SCNC: 28 MMOL/L (ref 21–32)
CREAT SERPL-MCNC: 0.9 MG/DL (ref 0.55–1.02)
DIFFERENTIAL METHOD BLD: ABNORMAL
EOSINOPHIL # BLD: 0 K/UL (ref 0–0.4)
EOSINOPHIL NFR BLD: 0 % (ref 0–7)
ERYTHROCYTE [DISTWIDTH] IN BLOOD BY AUTOMATED COUNT: 12.2 % (ref 11.5–14.5)
FLUAV H1 2009 PAND RNA SPEC QL NAA+PROBE: NOT DETECTED
FLUAV H1 RNA SPEC QL NAA+PROBE: NOT DETECTED
FLUAV H3 RNA SPEC QL NAA+PROBE: NOT DETECTED
FLUAV SUBTYP SPEC NAA+PROBE: NOT DETECTED
FLUBV RNA SPEC QL NAA+PROBE: NOT DETECTED
GLUCOSE SERPL-MCNC: 150 MG/DL (ref 65–100)
HADV DNA SPEC QL NAA+PROBE: NOT DETECTED
HCOV 229E RNA SPEC QL NAA+PROBE: NOT DETECTED
HCOV HKU1 RNA SPEC QL NAA+PROBE: NOT DETECTED
HCOV NL63 RNA SPEC QL NAA+PROBE: NOT DETECTED
HCOV OC43 RNA SPEC QL NAA+PROBE: NOT DETECTED
HCT VFR BLD AUTO: 40.3 % (ref 35–47)
HGB BLD-MCNC: 13.3 G/DL (ref 11.5–16)
HMPV RNA SPEC QL NAA+PROBE: NOT DETECTED
HPIV1 RNA SPEC QL NAA+PROBE: NOT DETECTED
HPIV2 RNA SPEC QL NAA+PROBE: NOT DETECTED
HPIV3 RNA SPEC QL NAA+PROBE: NOT DETECTED
HPIV4 RNA SPEC QL NAA+PROBE: NOT DETECTED
IMM GRANULOCYTES # BLD: 0.1 K/UL (ref 0–0.04)
IMM GRANULOCYTES NFR BLD AUTO: 1 % (ref 0–0.5)
LYMPHOCYTES # BLD: 0.4 K/UL (ref 0.8–3.5)
LYMPHOCYTES NFR BLD: 6 % (ref 12–49)
M PNEUMO DNA SPEC QL NAA+PROBE: NOT DETECTED
MAGNESIUM SERPL-MCNC: 2 MG/DL (ref 1.6–2.4)
MCH RBC QN AUTO: 33.4 PG (ref 26–34)
MCHC RBC AUTO-ENTMCNC: 33 G/DL (ref 30–36.5)
MCV RBC AUTO: 101.3 FL (ref 80–99)
MONOCYTES # BLD: 0.4 K/UL (ref 0–1)
MONOCYTES NFR BLD: 6 % (ref 5–13)
NEUTS SEG # BLD: 5.9 K/UL (ref 1.8–8)
NEUTS SEG NFR BLD: 88 % (ref 32–75)
NRBC # BLD: 0 K/UL (ref 0–0.01)
NRBC BLD-RTO: 0 PER 100 WBC
PHOSPHATE SERPL-MCNC: 3.8 MG/DL (ref 2.6–4.7)
PLATELET # BLD AUTO: 166 K/UL (ref 150–400)
PMV BLD AUTO: 10.3 FL (ref 8.9–12.9)
POTASSIUM SERPL-SCNC: 5.4 MMOL/L (ref 3.5–5.1)
RBC # BLD AUTO: 3.98 M/UL (ref 3.8–5.2)
RSV RNA SPEC QL NAA+PROBE: NOT DETECTED
RV+EV RNA SPEC QL NAA+PROBE: NOT DETECTED
SODIUM SERPL-SCNC: 144 MMOL/L (ref 136–145)
WBC # BLD AUTO: 6.8 K/UL (ref 3.6–11)

## 2018-02-11 PROCEDURE — 80048 BASIC METABOLIC PNL TOTAL CA: CPT | Performed by: INTERNAL MEDICINE

## 2018-02-11 PROCEDURE — 84100 ASSAY OF PHOSPHORUS: CPT | Performed by: INTERNAL MEDICINE

## 2018-02-11 PROCEDURE — 94640 AIRWAY INHALATION TREATMENT: CPT

## 2018-02-11 PROCEDURE — 74011250637 HC RX REV CODE- 250/637: Performed by: INTERNAL MEDICINE

## 2018-02-11 PROCEDURE — 77010033678 HC OXYGEN DAILY

## 2018-02-11 PROCEDURE — 97116 GAIT TRAINING THERAPY: CPT

## 2018-02-11 PROCEDURE — 85025 COMPLETE CBC W/AUTO DIFF WBC: CPT | Performed by: INTERNAL MEDICINE

## 2018-02-11 PROCEDURE — 74011000250 HC RX REV CODE- 250: Performed by: INTERNAL MEDICINE

## 2018-02-11 PROCEDURE — 65270000029 HC RM PRIVATE

## 2018-02-11 PROCEDURE — 74011250636 HC RX REV CODE- 250/636: Performed by: INTERNAL MEDICINE

## 2018-02-11 PROCEDURE — 97161 PT EVAL LOW COMPLEX 20 MIN: CPT

## 2018-02-11 PROCEDURE — 83735 ASSAY OF MAGNESIUM: CPT | Performed by: INTERNAL MEDICINE

## 2018-02-11 PROCEDURE — 36415 COLL VENOUS BLD VENIPUNCTURE: CPT | Performed by: INTERNAL MEDICINE

## 2018-02-11 RX ADMIN — BUDESONIDE 500 MCG: 0.5 INHALANT RESPIRATORY (INHALATION) at 20:03

## 2018-02-11 RX ADMIN — BUDESONIDE 500 MCG: 0.5 INHALANT RESPIRATORY (INHALATION) at 09:20

## 2018-02-11 RX ADMIN — ENOXAPARIN SODIUM 40 MG: 40 INJECTION SUBCUTANEOUS at 21:17

## 2018-02-11 RX ADMIN — IPRATROPIUM BROMIDE AND ALBUTEROL SULFATE 3 ML: .5; 3 SOLUTION RESPIRATORY (INHALATION) at 12:34

## 2018-02-11 RX ADMIN — METHYLPREDNISOLONE SODIUM SUCCINATE 60 MG: 125 INJECTION, POWDER, FOR SOLUTION INTRAMUSCULAR; INTRAVENOUS at 09:44

## 2018-02-11 RX ADMIN — METHYLPREDNISOLONE SODIUM SUCCINATE 60 MG: 125 INJECTION, POWDER, FOR SOLUTION INTRAMUSCULAR; INTRAVENOUS at 02:54

## 2018-02-11 RX ADMIN — METHYLPREDNISOLONE SODIUM SUCCINATE 60 MG: 125 INJECTION, POWDER, FOR SOLUTION INTRAMUSCULAR; INTRAVENOUS at 15:00

## 2018-02-11 RX ADMIN — Medication 10 ML: at 22:18

## 2018-02-11 RX ADMIN — IPRATROPIUM BROMIDE AND ALBUTEROL SULFATE 3 ML: .5; 3 SOLUTION RESPIRATORY (INHALATION) at 09:20

## 2018-02-11 RX ADMIN — IPRATROPIUM BROMIDE AND ALBUTEROL SULFATE 3 ML: .5; 3 SOLUTION RESPIRATORY (INHALATION) at 20:03

## 2018-02-11 RX ADMIN — LEVOFLOXACIN 250 MG: 250 TABLET, FILM COATED ORAL at 18:18

## 2018-02-11 RX ADMIN — Medication 10 ML: at 12:07

## 2018-02-11 RX ADMIN — METHYLPREDNISOLONE SODIUM SUCCINATE 60 MG: 125 INJECTION, POWDER, FOR SOLUTION INTRAMUSCULAR; INTRAVENOUS at 22:17

## 2018-02-11 RX ADMIN — IPRATROPIUM BROMIDE AND ALBUTEROL SULFATE 3 ML: .5; 3 SOLUTION RESPIRATORY (INHALATION) at 16:11

## 2018-02-11 RX ADMIN — Medication 10 ML: at 06:33

## 2018-02-11 NOTE — PROGRESS NOTES
[facility] [address]  [address], [place], [address] [facility]  [phone]      Medical Progress Note      NAME: Pj Villarreal   :  1940  MRM:  [id number]    Date/Time: 2018  10:19 AM          Subjective:     Chief Complaint:  SOB: mild this AM, improved with treatments overnight, constant, worse with exertion,     ROS:  (bold if positive, if negative)                SOB/GORE        Tolerating Diet          Objective:       Vitals:          Last 24hrs VS reviewed since prior progress note.  Most recent are:    Visit Vitals    /61 (BP 1 Location: Left arm, BP Patient Position: At rest)    Pulse 82    Temp 98 °F (36.7 °C)    Resp 18    Ht 5' 1.5\" (1.562 m)    Wt 47.2 kg (104 lb)    SpO2 95%    BMI 19.33 kg/m2     SpO2 Readings from Last 6 Encounters:   18 95%    O2 Flow Rate (L/min): 2 l/min   No intake or output data in the 24 hours ending 18 1019       Exam:     Physical Exam:    Gen:  Well-developed, thin, frail, elderly, in no acute distress  HEENT:  Pink conjunctivae, PERRL, hearing intact to voice, moist mucous membranes  Neck:  Supple, without masses, thyroid non-tender  Resp:  No accessory muscle use, diminished throughout, slightly prolonged expiratory phase, few wheezes and rhonchi  Card:  No murmurs, normal S1, S2 without thrills, bruits or peripheral edema  Abd:  Soft, non-tender, non-distended, normoactive bowel sounds are present, no palpable organomegaly and no detectable hernias  Lymph:  No cervical or inguinal adenopathy  Musc:  No cyanosis or clubbing  Skin:  No rashes or ulcers, skin turgor is good  Neuro:  Cranial nerves are grossly intact, no focal motor weakness, follows commands appropriately  Psych:  Good insight, oriented to person, place and time, alert       Medications Reviewed: (see below)    Lab Data Reviewed: (see below)    ______________________________________________________________________    Medications: Current Facility-Administered Medications   Medication Dose Route Frequency    sodium chloride (NS) flush 5-10 mL  5-10 mL IntraVENous Q8H    sodium chloride (NS) flush 5-10 mL  5-10 mL IntraVENous PRN    acetaminophen (TYLENOL) tablet 650 mg  650 mg Oral Q4H PRN    ondansetron (ZOFRAN) injection 4 mg  4 mg IntraVENous Q4H PRN    enoxaparin (LOVENOX) injection 40 mg  40 mg SubCUTAneous Q24H    nicotine (NICODERM CQ) 14 mg/24 hr patch 1 Patch  1 Patch TransDERmal Q24H    albuterol-ipratropium (DUO-NEB) 2.5 MG-0.5 MG/3 ML  3 mL Nebulization Q4HWA RT    albuterol (PROVENTIL VENTOLIN) nebulizer solution 2.5 mg  2.5 mg Nebulization Q2H PRN    methylPREDNISolone (PF) (SOLU-MEDROL) injection 60 mg  60 mg IntraVENous Q6H    budesonide (PULMICORT) 500 mcg/2 ml nebulizer suspension  500 mcg Nebulization BID RT    arformoterol (BROVANA) neb solution 15 mcg  15 mcg Nebulization BID RT    levoFLOXacin (LEVAQUIN) tablet 250 mg  250 mg Oral Q24H            Lab Review:     Recent Labs      02/11/18   0502  02/10/18   0259  02/09/18   1610   WBC  6.8  1.6*  4.2   HGB  13.3  13.8  14.4   HCT  40.3  41.4  44.0   PLT  166  145*  144*     Recent Labs      02/11/18   0502  02/10/18   0259  02/09/18   1610   NA  144  145  146*   K  5.4*  3.4*  3.5   CL  109*  105  107   CO2  28  30  38*   GLU  150*  334*  97   BUN  23*  17  15   CREA  0.90  1.09*  0.81   CA  9.5  8.2*  8.4*   MG  2.0  1.8   --    PHOS  3.8   --    --    ALB   --   2.8*  2.8*   TBILI   --   0.3  0.5   SGOT   --   35  46*   ALT   --   33  37     No results found for: GLUCPOC  No results for input(s): PH, PCO2, PO2, HCO3, FIO2 in the last 72 hours. No results for input(s): INR in the last 72 hours.     No lab exists for component: INREXT, INREXT  No results found for: SDES  No results found for: CULT         Assessment:     Principal Problem:    COPD exacerbation ([address] (2/9/2018)    Active Problems:    Tobacco abuse (2/9/2018)      Hypoxia (2/9/2018) Hypernatremia (2/9/2018)      Leukopenia (2/10/2018)      Hypokalemia (2/10/2018)      Thrombocytopenia ([address] (2/10/2018)           Plan:     Principal Problem:    COPD exacerbation ([facility] (2/9/2018)   - continue nebs, steroids, levofloxacin   - viral panel negative    Active Problems:    Tobacco abuse (2/9/2018)   - counseled on cessation      Hypoxia (2/9/2018)   - due to COPD, not on home oxygen   - wean oxygen as tolerated   - will need 6-min walk PTD      Leukopenia (2/10/2018)   - WBC down this AM, suspect underlying viral infection   - follow on steroids and antibiotics   - if drops further may need to consult Hem/Onc      Hypokalemia (2/10/2018)   - resolved      Thrombocytopenia ([facility] (2/10/2018)   - platelets normal today, follow on enoxaparin      Total time spent with patient: 25 minutes                  Care Plan discussed with: Patient and Nursing Staff    Discussed:  Code Status, Care Plan and D/C Planning    Prophylaxis:  Lovenox    Disposition:  Home w/Family           ___________________________________________________    Attending Physician: Olivia Neal MD

## 2018-02-11 NOTE — PROGRESS NOTES
Bedside and Verbal shift change report given to Bin Matthew RN (oncoming nurse) by Zheng Pleitez RN (offgoing nurse). Report included the following information Intake/Output, MAR, Accordion and Recent Results.

## 2018-02-12 VITALS
BODY MASS INDEX: 19.14 KG/M2 | HEART RATE: 79 BPM | RESPIRATION RATE: 16 BRPM | WEIGHT: 104 LBS | OXYGEN SATURATION: 93 % | TEMPERATURE: 97.8 F | DIASTOLIC BLOOD PRESSURE: 55 MMHG | SYSTOLIC BLOOD PRESSURE: 129 MMHG | HEIGHT: 62 IN

## 2018-02-12 LAB
ANION GAP SERPL CALC-SCNC: 6 MMOL/L (ref 5–15)
BUN SERPL-MCNC: 27 MG/DL (ref 6–20)
BUN/CREAT SERPL: 30 (ref 12–20)
CALCIUM SERPL-MCNC: 9.5 MG/DL (ref 8.5–10.1)
CHLORIDE SERPL-SCNC: 111 MMOL/L (ref 97–108)
CO2 SERPL-SCNC: 29 MMOL/L (ref 21–32)
CREAT SERPL-MCNC: 0.91 MG/DL (ref 0.55–1.02)
ERYTHROCYTE [DISTWIDTH] IN BLOOD BY AUTOMATED COUNT: 12.4 % (ref 11.5–14.5)
GLUCOSE SERPL-MCNC: 160 MG/DL (ref 65–100)
HCT VFR BLD AUTO: 40.6 % (ref 35–47)
HGB BLD-MCNC: 13.2 G/DL (ref 11.5–16)
MAGNESIUM SERPL-MCNC: 2.1 MG/DL (ref 1.6–2.4)
MCH RBC QN AUTO: 33.2 PG (ref 26–34)
MCHC RBC AUTO-ENTMCNC: 32.5 G/DL (ref 30–36.5)
MCV RBC AUTO: 102.3 FL (ref 80–99)
NRBC # BLD: 0 K/UL (ref 0–0.01)
NRBC BLD-RTO: 0 PER 100 WBC
PHOSPHATE SERPL-MCNC: 4.1 MG/DL (ref 2.6–4.7)
PLATELET # BLD AUTO: 174 K/UL (ref 150–400)
PMV BLD AUTO: 10.3 FL (ref 8.9–12.9)
POTASSIUM SERPL-SCNC: 4.9 MMOL/L (ref 3.5–5.1)
RBC # BLD AUTO: 3.97 M/UL (ref 3.8–5.2)
SODIUM SERPL-SCNC: 146 MMOL/L (ref 136–145)
WBC # BLD AUTO: 7.9 K/UL (ref 3.6–11)

## 2018-02-12 PROCEDURE — 94640 AIRWAY INHALATION TREATMENT: CPT

## 2018-02-12 PROCEDURE — 77010033678 HC OXYGEN DAILY

## 2018-02-12 PROCEDURE — 80048 BASIC METABOLIC PNL TOTAL CA: CPT | Performed by: INTERNAL MEDICINE

## 2018-02-12 PROCEDURE — 85027 COMPLETE CBC AUTOMATED: CPT | Performed by: INTERNAL MEDICINE

## 2018-02-12 PROCEDURE — 74011000250 HC RX REV CODE- 250: Performed by: INTERNAL MEDICINE

## 2018-02-12 PROCEDURE — 74011250636 HC RX REV CODE- 250/636: Performed by: INTERNAL MEDICINE

## 2018-02-12 PROCEDURE — 83735 ASSAY OF MAGNESIUM: CPT | Performed by: INTERNAL MEDICINE

## 2018-02-12 PROCEDURE — 84100 ASSAY OF PHOSPHORUS: CPT | Performed by: INTERNAL MEDICINE

## 2018-02-12 PROCEDURE — 36415 COLL VENOUS BLD VENIPUNCTURE: CPT | Performed by: INTERNAL MEDICINE

## 2018-02-12 RX ORDER — LEVOFLOXACIN 250 MG/1
250 TABLET ORAL EVERY 24 HOURS
Qty: 3 TAB | Refills: 0 | Status: SHIPPED | OUTPATIENT
Start: 2018-02-12 | End: 2018-02-15

## 2018-02-12 RX ORDER — NEBULIZER AND COMPRESSOR
1 EACH MISCELLANEOUS
Qty: 1 EACH | Refills: 0 | Status: SHIPPED | OUTPATIENT
Start: 2018-02-12 | End: 2018-02-12

## 2018-02-12 RX ORDER — FLUTICASONE PROPIONATE AND SALMETEROL 250; 50 UG/1; UG/1
1 POWDER RESPIRATORY (INHALATION) EVERY 12 HOURS
Qty: 60 EACH | Refills: 0 | Status: SHIPPED | OUTPATIENT
Start: 2018-02-12 | End: 2018-03-14

## 2018-02-12 RX ORDER — PREDNISONE 10 MG/1
TABLET ORAL
Qty: 30 TAB | Refills: 0 | Status: SHIPPED | OUTPATIENT
Start: 2018-02-12 | End: 2019-03-21

## 2018-02-12 RX ORDER — IBUPROFEN 200 MG
1 TABLET ORAL EVERY 24 HOURS
Qty: 30 PATCH | Refills: 0 | Status: SHIPPED | OUTPATIENT
Start: 2018-02-12 | End: 2018-03-14

## 2018-02-12 RX ADMIN — Medication 10 ML: at 06:01

## 2018-02-12 RX ADMIN — METHYLPREDNISOLONE SODIUM SUCCINATE 60 MG: 125 INJECTION, POWDER, FOR SOLUTION INTRAMUSCULAR; INTRAVENOUS at 08:14

## 2018-02-12 RX ADMIN — BUDESONIDE 500 MCG: 0.5 INHALANT RESPIRATORY (INHALATION) at 07:23

## 2018-02-12 RX ADMIN — IPRATROPIUM BROMIDE AND ALBUTEROL SULFATE 3 ML: .5; 3 SOLUTION RESPIRATORY (INHALATION) at 07:23

## 2018-02-12 RX ADMIN — METHYLPREDNISOLONE SODIUM SUCCINATE 60 MG: 125 INJECTION, POWDER, FOR SOLUTION INTRAMUSCULAR; INTRAVENOUS at 03:26

## 2018-02-12 RX ADMIN — IPRATROPIUM BROMIDE AND ALBUTEROL SULFATE 3 ML: .5; 3 SOLUTION RESPIRATORY (INHALATION) at 11:27

## 2018-02-12 NOTE — PROGRESS NOTES
Bedside and Verbal shift change report given to Ana Guzman RN (oncoming nurse) by Lance Clifton RN (offgoing nurse). Report included the following information Intake/Output, MAR, Accordion and Recent Results.

## 2018-02-12 NOTE — PROGRESS NOTES
Discharge instructions reviewed with the patient and her  and all questions answered. Peripheral IV removed. Patient's  at bedside who will drive her home.

## 2018-02-12 NOTE — DISCHARGE SUMMARY
Gu[facility] I[facility] [place] [address]  [address], [place], [address] [facility]  Tel: [phone]    Physician Discharge Summary    Patient ID:    Ni Mixon  Age:              65 y.o.    : 1940  MRN:             [id number]     PCP: eZ Espinoza MD     Admit date: 2018    Discharge date: 2018    Principal admission Diagnosis:   COPD exacerbation [facility])    Discharge Diagnoses:  Principal Problem:    COPD exacerbation ([address] (2018)    Hypoxia (2018)    Leukopenia (2/10/2018)    Thrombocytopenia ([address] (2/10/2018)    Tobacco abuse (2018)    Consults: None    Hospital Course:     Ms. Linette Garcia is a 65 y.o. admitted to [facility] and treated for the following:    COPD exacerbation ([address] (2018): now feels much better and stable. No wheezing. Continue a prednisone taper, Advair and complete levofloxacin. Viral panel negative     Hypoxia (2018): due to COPD, not on home oxygen. Had a 6 min oximetry which was borderline. She categorically said she would not take oxygen at home. Advised her to follow with PCP for review    Leukopenia (2/10/2018): due to viral infection. Improved. No further work up for now. Hypokalemia (2/10/2018): resolved     Thrombocytopenia ([address] (2/10/2018): stable. Out patient follow up    Tobacco abuse (2018): counseled on cessation.  Continue Nicotine patch    Discharge Exam:    Visit Vitals    /55 (BP 1 Location: Left arm, BP Patient Position: At rest)    Pulse 79    Temp 97.8 °F (36.6 °C)    Resp 16    Ht 5' 1.5\" (1.562 m)    Wt 47.2 kg (104 lb)    SpO2 93%    BMI 19.33 kg/m2      General: well looking and stable patient in no acute distress  Pulm: clear breath sounds without wheezes  Card: no murmurs, normal S1, S2 without thrills, bruits   Abd:    soft, non-tender, normoactive bowel sounds  Skin: no rashes or ulcers, skin turgor is good  Neuro: awake, alert and has a non focal Activity: Activity as tolerated    Diet: Regular Diet    Current Discharge Medication List      START taking these medications    Details   levoFLOXacin (LEVAQUIN) 250 mg tablet Take 1 Tab by mouth every twenty-four (24) hours for 3 days. Qty: 3 Tab, Refills: 0      nicotine (NICODERM CQ) 14 mg/24 hr patch 1 Patch by TransDERmal route every twenty-four (24) hours for 30 days. Qty: 30 Patch, Refills: 0      predniSONE (DELTASONE) 10 mg tablet Prednisone 10mg tabs:  4 tabs (40 mg) daily x 3 days, then  3 tabs (30 mg) daily x 3 days, then  2 tabs (20 mg) daily x 3 days, then  1 tab   (10 mg) daily until gone. Dispense 30 tabs  Qty: 30 Tab, Refills: 0      fluticasone-salmeterol (ADVAIR DISKUS) 250-50 mcg/dose diskus inhaler Take 1 Puff by inhalation every twelve (12) hours for 30 days. Qty: 60 Each, Refills: 0         CONTINUE these medications which have NOT CHANGED    Details   benzonatate (TESSALON PERLES) 100 mg capsule Take 100 mg by mouth three (3) times daily as needed for Cough. albuterol (VENTOLIN HFA) 90 mcg/actuation inhaler Take 2 Puffs by inhalation every four (4) hours as needed for Wheezing. STOP taking these medications       doxycycline (ADOXA) 100 mg tablet Comments:   Reason for Stopping: Follow-up Information     Follow up With Details Comments Contact Info    Leena Jauregui MD Schedule an appointment as soon as possible for a visit for review [place]  [phone]            Follow-up tests or labs: None    Discharge Condition: Stable    Disposition: home    Time taken to arrange discharge:  35 minutes.     Signed:  Lottie Apley, MD     [facility]  2/12/2018   8:07 AM

## 2018-02-12 NOTE — PROGRESS NOTES
I met with pt after discussing pt with attending. Pt resides with her  in [place]. Pt has declined home oxygen and is declining the one time hospital to home COPD visit. I am requesting Carroll Dozier with [facility] to meet with pt prior to discharge regarding their program.I explained to pt that we are trying to help prevent her from being readmitted in the future. Pt was anxious during our conversation. I informed pt about the 10 am discharge time and she stated in an irritable tone \"Aren't you expecting too much too fast this morning\"? \"Noone can make my  get here before he gets here. \"I then proposed a 11 am discharge time which pt did not like either. She stated \"again,my  will get here when he gets here. \"    I discussed pt with her nurse. At this time,there are no identified discharge needs as pt is declining services offered . Jj Lipscomb  Team B with Dr Lauren Clay  [phone]    Addendum-From a case management perspective,pt is okay for discharge.   jJ Lipscomb

## 2018-02-12 NOTE — PROGRESS NOTES
I remet with pt after she informed me that she was told she could get a new nebulizer as her mask didn't fit her correctly. Pt states she prefers the nebulizer with the pipe device. I explained to pt that insurance will on;y pay for one nebulizer. I explainedto pt that she could take a script with her to [facility] in case [id number] [facility] [facility]uffy does not have nebulizers. I notified attending regarding pt.'s request for nebulizer with pipe device instead of mask. He is working on script for pt. I notified attending. Thank you.   Fifi People  Team B with Dr Deidre Herrmann  [phone]

## 2018-02-12 NOTE — ROUTINE PROCESS
Bedside and Verbal shift change report given to Eugene Garcia RN (oncoming nurse) by Susan Tellez (offgoing nurse). Report included the following information Intake/Output, MAR, Accordion and Recent Results.

## 2018-02-12 NOTE — PROGRESS NOTES
In talking with pt.'s ,pt already has a nebulizer that she used once and put back in her closet. I discussed with pt.'s nurse and pt/ with RT.   Maury Verdugo  Team B with Dr Melanie Gutierrez  [phone]

## 2018-02-12 NOTE — PROGRESS NOTES
Problem: Mobility Impaired (Adult and Pediatric)  Goal: *Acute Goals and Plan of Care (Insert Text)  Physical Therapy Goals  Initiated 2/11/2018  1. Patient will move from supine to sit and sit to supine , scoot up and down and roll side to side in bed from flat supine without railings with independence within 7 day(s). 2.  Patient will transfer from bed to chair and chair to bed with modified independence using the least restrictive device within 7 day(s). 3.  Patient will perform sit <> stand with modified independence within 7 day(s). 4.  Patient will ambulate with modified independence for 75 feet with the least restrictive device within 7 day(s). 5.  Patient will ascend/descend 7 stairs with 1 handrail(s) and cane with modified independence within 7 day(s). physical Therapy EVALUATION  Patient: Rubén Adhikari (80 y.o. female)  Date: 2/11/2018  Primary Diagnosis: COPD exacerbation ([facility] [address]        Precautions:   Fall    ASSESSMENT : patient seen ~1530 this afternoon  Based on the objective data described below, the patient presents with admission 2 days ago for COPD exacerbation. Reports feeling some better since admission but not back to baseline. Patient little miffed PT interrupted her finishing dessert but explained importance of PT and OOB. On 1 L nasal cannula on arrival and tested amb on RA. Patient with long history of COPD but has not required home oxygen in past. Still smokes per chart. Patient very thin at just 104 pounds and 5'1\". She demonstrates mildly impaired coordination, several instances of path deviations with gait without device as well as occasional mis steps into scissoring pattern, overall decreased dynamic balance. She states she has never needed cane or home oxygen and seems slightly resistant to mention of either.  Explained relationship between long history of progressive disease COPD,activity tolerance staci with exacerbation, use of steroids, bone deminneralization, fall prevention otherwise possible fall and fractures. Softened patient by focusing on recommendation is for her welfare. Patient scored 19/28 on Tinetti Balance test and would be interesting to see how she fairs on [employer]. She may need these results to convince her it may be time to consider rollator for energy conservation, fall prevention and to increase rather than decrease activity level, as she always will have seat to rest with rollator especially with community mob. Patient did not give consent to PT to order rollator at this time. Wants to consider cane instead. As she improves will attempt Mensah Balance Test to further quantify balance deficits but at this time recommend trial with actual Rollator. Patient frail and work of breathing at rest then mild dyspneic w/activity. She reports she has been in bed essentially for last 8 days. PT will follow to mobilize, improve gait safety, clear for discharge to home with possible HHPT. Not sure if patient would consider pulmonary rehab -outpatient, that would be most beneficial staci convince smoking cessation. Documentation for home O2:     ROOM AIR    AT REST   O2 SATS  91 HR  100   ROOM AIR WITH ACTIVITY- bed mobility 02 SATS  89 HR  117   Room Air WITH ACTIVITY- 200' amb O2 SATS  88-89 HR  114   (1   )LITERS OF 02 PATIENT LEFT COMFORTABLY  SITTING/SUPINE 02 SATS  89 HR  105           Patient will benefit from skilled intervention to address the above impairments.   Patients rehabilitation potential is considered to be Fair  Factors which may influence rehabilitation potential include:   []         None noted  []         Mental ability/status  [x]         Medical condition  []         Home/family situation and support systems  [x]         Safety awareness  []         Pain tolerance/management  []         Other:      PLAN :  Recommendations and Planned Interventions:  [x]           Bed Mobility Training             []    Neuromuscular Re-Education  [x] Transfer Training                   []    Orthotic/Prosthetic Training  [x]           Gait Training                         []    Modalities  [x]           Therapeutic Exercises           []    Edema Management/Control  [x]           Therapeutic Activities            [x]    Patient and Family Training/Education  []           Other (comment):    Frequency/Duration: Patient will be followed by physical therapy  5 times a week to address goals. Discharge Recommendations: Home Health and Inpatient/Outpatient pulmonary rehab pending progress  Further Equipment Recommendations for Discharge: TBD Possibly rollator or another ADevice     SUBJECTIVE:   Patient stated I have a lot of stairs & I don't want that big thing in house.  Discussion about fall prevention    OBJECTIVE DATA SUMMARY:   HISTORY:    Past Medical History:   Diagnosis Date    Chronic obstructive pulmonary disease ([facility] [address]    History reviewed. No pertinent surgical history. Prior Level of Function/Home Situation:   Personal factors and/or comorbidities impacting plan of care:     Home Situation  Home Environment: Private residence  # Steps to Enter: 5  Rails to Enter: Yes  Wheelchair Ramp: No  One/Two Story Residence: Two story  # of Interior Steps: 7 (multiple short stair cases)  Interior Rails:  (single)  Lift Chair Available: No  Living Alone: No  Support Systems: Spouse/Significant Other/Partner  Patient Expects to be Discharged to[de-identified] Private residence  Current DME Used/Available at Home: Cane, straight    EXAMINATION/PRESENTATION/DECISION MAKING:   Critical Behavior:   alert and oriented x 4           Hearing:   Auditory  Auditory Impairment: None    Range Of Motion:  AROM: Generally decreased, functional           PROM: Generally decreased, functional           Strength:    Strength: Generally decreased, functional                    Tone & Sensation:   Tone: Normal              Sensation:  (NT)               Coordination:  Coordination: Grossly decreased, non-functional       Functional Mobility:  Bed Mobility:  Rolling: Modified independent  Supine to Sit: Modified independent  Sit to Supine: Modified independent  Scooting: Modified independent  Transfers:  Sit to Stand: Stand-by asssistance  Stand to Sit: Stand-by asssistance  Stand Pivot Transfers: Stand-by asssistance                    Balance:   Sitting: Intact  Standing: Impaired  Standing - Static: Fair  Standing - Dynamic : Poor  Ambulation/Gait Training:  Distance (ft): 200 Feet (ft)  Assistive Device: Gait belt  Ambulation - Level of Assistance: Minimal assistance        Gait Abnormalities: Path deviations;Scissoring        Base of Support: Narrowed     Stairs:  Number of Stairs Trained: 4  Stairs - Level of Assistance: Minimum assistance   Rail Use: Right     Functional Measure:  Tinetti test:    Sitting Balance: 1  Arises: 1  Attempts to Rise: 2  Immediate Standing Balance: 1  Standing Balance: 0  Nudged: 1  Eyes Closed: 1  Turn 360 Degrees - Continuous/Discontinuous: 0  Turn 360 Degrees - Steady/Unsteady: 0  Sitting Down: 1  Balance Score: 8  Indication of Gait: 1  R Step Length/Height: 1  L Step Length/Height: 1  R Foot Clearance: 1  L Foot Clearance: 1  Step Symmetry: 1  Step Continuity: 1  Path: 1  Trunk: 2  Walking Time: 1  Gait Score: 11  Total Score: 19       Tinetti Test and G-code impairment scale:  Percentage of Impairment CH    0%   CI    1-19% CJ    20-39% CK    40-59% CL    60-79% CM    80-99% CN     100%   Tinetti  Score 0-28 28 23-27 17-22 12-16 6-11 1-5 0       Tinetti Tool Score Risk of Falls  <19 = High Fall Risk  19-24 = Moderate Fall Risk  25-28 = Low Fall Risk  Tinetti ME. Performance-Oriented Assessment of Mobility Problems in Elderly Patients. [place] [address]; [id number].  (Scoring Description: PT Bulletin Feb. 10, 1993)    Older adults: Ivana Mejias et al, 2009; n = [address] [employer] elderly evaluated with ABC, JOSE, ADL, and IADL)  · Mean JOSE score for males aged 69-68 years = 26.21(3.40)  · Mean JOSE score for females age 69-68 years = 25.16(4.30)  · Mean JOSE score for males over 80 years = 23.29(6.02)  · Mean JOSE score for females over 80 years = 17.20(8.32)       G codes: In compliance with CMSs Claims Based Outcome Reporting, the following G-code set was chosen for this patient based on their primary functional limitation being treated: The outcome measure chosen to determine the severity of the functional limitation was the Tinetti Test with a score of 19/28 which was correlated with the impairment scale. ? Mobility - Walking and Moving Around:     - CURRENT STATUS: CJ - 20%-39% impaired, limited or restricted    - GOAL STATUS: CI - 1%-19% impaired, limited or restricted    - D/C STATUS:  ---------------To be determined---------------      Physical Therapy Evaluation Charge Determination   History Examination Presentation Decision-Making   MEDIUM  Complexity : 1-2 comorbidities / personal factors will impact the outcome/ POC  MEDIUM Complexity : 3 Standardized tests and measures addressing body structure, function, activity limitation and / or participation in recreation  MEDIUM Complexity : Evolving with changing characteristics  Other outcome measures Tinetti Test  MEDIUM      Based on the above components, the patient evaluation is determined to be of the following complexity level: MEDIUM    Pain:  Pain Scale 1: Numeric (0 - 10)  Pain Intensity 1: 0              Activity Tolerance:   fair  Please refer to the flowsheet for vital signs taken during this treatment. After treatment:   []         Patient left in no apparent distress sitting up in chair  [x]         Patient left in no apparent distress in bed  [x]         Call bell left within reach  [x]         Nursing notified  []         Caregiver present  []         Bed alarm activated    COMMUNICATION/EDUCATION:   The patients plan of care was discussed with: Registered Nurse.   [x]         Fall prevention education was provided and the patient/caregiver indicated understanding. [x]         Patient/family have participated as able in goal setting and plan of care. []         Patient/family agree to work toward stated goals and plan of care. []         Patient understands intent and goals of therapy, but is neutral about his/her participation. []         Patient is unable to participate in goal setting and plan of care.     Thank you for this referral.  Anton Wilde, PT   Time Calculation: 30 mins

## 2019-03-11 ENCOUNTER — OFFICE VISIT (OUTPATIENT)
Dept: NEUROLOGY | Age: 79
End: 2019-03-11

## 2019-03-11 VITALS
RESPIRATION RATE: 20 BRPM | BODY MASS INDEX: 18.58 KG/M2 | DIASTOLIC BLOOD PRESSURE: 68 MMHG | HEART RATE: 64 BPM | HEIGHT: 62 IN | SYSTOLIC BLOOD PRESSURE: 148 MMHG | WEIGHT: 101 LBS | OXYGEN SATURATION: 95 %

## 2019-03-11 DIAGNOSIS — R41.89 COGNITIVE DECLINE: ICD-10-CM

## 2019-03-11 DIAGNOSIS — R53.83 OTHER FATIGUE: ICD-10-CM

## 2019-03-11 DIAGNOSIS — R41.89 COGNITIVE DECLINE: Primary | ICD-10-CM

## 2019-03-11 RX ORDER — MAGNESIUM 30 MG
TABLET ORAL
COMMUNITY

## 2019-03-11 RX ORDER — ZOLPIDEM TARTRATE 5 MG/1
TABLET ORAL
Refills: 0 | COMMUNITY
Start: 2019-02-27

## 2019-03-11 NOTE — LETTER
3/11/2019 3:10 PM 
 
Patient:  Jillian Teague YOB: 1940 Date of Visit: 3/11/2019 Dear Art Katz MD 
[address] [place] [id number] VIA Facsimile: [phone] 
 : Thank you for referring Ms. Jillian Teague to me for evaluation/treatment. Below are the relevant portions of my assessment and plan of care. If you have questions, please do not hesitate to call me. I look forward to following Ms. Rafat Barrett along with you. Sincerely, Shane Richarsd MD

## 2019-03-11 NOTE — PROGRESS NOTES
NEUROLOGY NEW PATIENT OFFICE CONSULTATION      3/11/2019    RE: Donnice Osgood         1940      REFERRED BY:  Ling Wadsworth MD        CHIEF COMPLAINT:  This is Donnice Osgood is a 77 y.o. 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 HPI:     For the past 3 months, patient noted cognitive problem described as problem with computers and having problem doing newsletters, not recognizing keys in the keyboard and not processing, word finding difficulty, problem with names and faces, not driving, started to be hoarding, needs help with finances. 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 (-) tongue bite (-) incontinence    (-) hallucinations  (+) agitation  (-) loss of interest  (-) depression  Sleeping okay.   (+) generalized fatigue with increased daytime naps  (+) gait disturbance  (+) weakness  (+) stress with taking her brother    ROS   (-) fever  (-) rash  All other systems reviewed and are negative    Past Medical Hx  Past Medical History:   Diagnosis Date    Asthma     [facility]     COPD with asthma ([facility]     Headache     Musculoskeletal disorder    R hearing loss tried on hearing aid    Social Hx  Social History     Socioeconomic History    Marital status:      Spouse name: Not on file    Number of children: Not on file    Years of education: Not on file    Highest education level: Not on file   Tobacco Use    Smoking status: Current Every Day Smoker   Substance and Sexual Activity    Alcohol use: No    Drug use: No       Family Hx  Family History   Problem Relation Age of Onset    Heart Disease Brother     Heart Disease Father        ALLERGIES  Allergies   Allergen Reactions    Latex Hives    Pcn [Penicillins] Hives       CURRENT MEDS  Current Outpatient Medications   Medication Sig Dispense Refill    zolpidem (AMBIEN) 5 mg tablet TK 1 T PO HS PRN  0    magnesium 30 mg tab Take  by mouth.  cholecalciferol, vitamin D3, (VITAMIN D3 PO) Take  by mouth.  albuterol (VENTOLIN HFA) 90 mcg/actuation inhaler Take 2 Puffs by inhalation every four (4) hours as needed for Wheezing.  predniSONE (DELTASONE) 10 mg tablet Prednisone 10mg tabs:  4 tabs (40 mg) daily x 3 days, then  3 tabs (30 mg) daily x 3 days, then  2 tabs (20 mg) daily x 3 days, then  1 tab   (10 mg) daily until gone. Dispense 30 tabs 30 Tab 0    benzonatate (TESSALON PERLES) 100 mg capsule Take 100 mg by mouth three (3) times daily as needed for Cough. PREVIOUS WORKUP: (reviewed)  IMAGING:    CT Results (recent):  Results from Hospital Encounter encounter on 02/09/18   CTA CHEST W OR W WO CONT    Narrative EXAM:  CTA CHEST W OR W WO CONT  INDICATION:   hypoxia  COMPARISON: None. .  TECHNIQUE:   Precontrast  images were obtained to localize the volume for acquisition. Multislice helical CT arteriography was performed from the diaphragm to the  thoracic inlet during uneventful rapid bolus intravenous contrast  administration. Lung and soft tissue windows were generated. Coronal and  sagittal images were generated and 3D post processing consisting of coronal  maximum intensity images was performed. CT dose reduction was achieved through use of a standardized protocol tailored  for this examination and automatic exposure control for dose modulation. Heddie Escanaba FINDINGS:  CHEST:  Chest wall/thoracic inlet: Within normal limits. Thyroid: Within normal limits. Mediastinum/roger: Fluid and air within the esophagus suggesting reflux or  dysmotility. Heart/vessels: Within normal limits. Lungs/Pleura: Centrilobular emphysema. .  INCIDENTALLY IMAGED ABDOMEN:  TIPS present. Cholelithiasis   .   MSK:   Minimal levoscoliosis, possibly positional.  .    Impression IMPRESSION:   1. No visualized pulmonary embolus. 2. Centrilobular emphysema. 3. Incidental findings as above. MRI Results (recent):  Results from [place] encounter on 02/02/12   MRI BRAIN W AND W/O CONTRAST    Narrative **Final Report**       ICD Codes / Adm. Diagnosis: 388.2   / SUDDEN HEARING LOSS, UNSPECIFI    Examination:  MR BRAIN W AND WO CON  - [id number] - Feb 2 2012  9:31PM  Accession No:  [id number]  Reason:  sudden hearing loss, evaluate CPA/IAC mass      REPORT:  CLINICAL INDICATION: Hearing loss right side. Technical factors: Diffusion imaging, sagittal T1-weighted, axial   T1-weighted pre- Post 9 mL IV Magnevist T2 and FLAIR axial gradient-echo   temporal bones axial and coronal T1-weighted temporal bones pre- and   postcontrast.    No prior. There is prominent atrophy and white matter disease. Temporal bones do   appear unremarkable, diffusion imaging is negative. There is no enhancing   lesion shift or mass effect. IMPRESSION: Prominent atrophy and white matter disease. Signing/Reading Doctor: Ricardo Cooper ([id number])    Approved: Ricardo Cooper ([id number])  02/03/2012                                      IR Results (recent):  No results found for this or any previous visit. VAS/US Results (recent):  No results found for this or any previous visit.         LABS (reviewed)  Results for orders placed or performed during the hospital encounter of 02/09/18   RESPIRATORY PANEL,PCR,NASOPHARYNGEAL   Result Value Ref Range    Adenovirus NOT DETECTED NOTD      Coronavirus 229E NOT DETECTED NOTD      Coronavirus HKU1 NOT DETECTED NOTD      Coronavirus CVNL63 NOT DETECTED NOTD      Coronavirus OC43 NOT DETECTED NOTD      Metapneumovirus NOT DETECTED NOTD      Rhinovirus and Enterovirus NOT DETECTED NOTD      Influenza A NOT DETECTED NOTD      Influenza A, subtype H1 NOT DETECTED NOTD      Influenza A, subtype H3 NOT DETECTED NOTD INFLUENZA A H1N1 PCR NOT DETECTED NOTD      Influenza B NOT DETECTED NOTD      Parainfluenza 1 NOT DETECTED NOTD      Parainfluenza 2 NOT DETECTED NOTD      Parainfluenza 3 NOT DETECTED NOTD      Parainfluenza virus 4 NOT DETECTED NOTD      RSV by PCR NOT DETECTED NOTD      Bordetella pertussis - PCR NOT DETECTED NOTD      Chlamydophila pneumoniae DNA, QL, PCR NOT DETECTED NOTD      Mycoplasma pneumoniae DNA, QL, PCR NOT DETECTED NOTD     CBC WITH AUTOMATED DIFF   Result Value Ref Range    WBC 4.2 3.6 - 11.0 K/uL    RBC 4.34 3.80 - 5.20 M/uL    HGB 14.4 11.5 - 16.0 g/dL    HCT 44.0 35.0 - 47.0 %    .4 (H) 80.0 - 99.0 FL    MCH 33.2 26.0 - 34.0 PG    MCHC 32.7 30.0 - 36.5 g/dL    RDW 11.9 11.5 - 14.5 %    PLATELET 268 (L) 683 - 400 K/uL    MPV 10.2 8.9 - 12.9 FL    NRBC 0.0 0  WBC    ABSOLUTE NRBC 0.00 0.00 - 0.01 K/uL    NEUTROPHILS 55 32 - 75 %    BAND NEUTROPHILS 2 0 - 6 %    LYMPHOCYTES 24 12 - 49 %    MONOCYTES 17 (H) 5 - 13 %    EOSINOPHILS 1 0 - 7 %    BASOPHILS 1 0 - 1 %    IMMATURE GRANULOCYTES 0 %    ABS. NEUTROPHILS 2.5 1.8 - 8.0 K/UL    ABS. LYMPHOCYTES 1.0 0.8 - 3.5 K/UL    ABS. MONOCYTES 0.7 0.0 - 1.0 K/UL    ABS. EOSINOPHILS 0.0 0.0 - 0.4 K/UL    ABS. BASOPHILS 0.0 0.0 - 0.1 K/UL    ABS. IMM. GRANS. 0.0 K/UL    DF MANUAL      RBC COMMENTS NORMOCYTIC, NORMOCHROMIC     METABOLIC PANEL, COMPREHENSIVE   Result Value Ref Range    Sodium 146 (H) 136 - 145 mmol/L    Potassium 3.5 3.5 - 5.1 mmol/L    Chloride 107 97 - 108 mmol/L    CO2 38 (H) 21 - 32 mmol/L    Anion gap 1 (L) 5 - 15 mmol/L    Glucose 97 65 - 100 mg/dL    BUN 15 6 - 20 MG/DL    Creatinine 0.81 0.55 - 1.02 MG/DL    BUN/Creatinine ratio 19 12 - 20      GFR est AA >60 >60 ml/min/1.73m2    GFR est non-AA >60 >60 ml/min/1.73m2    Calcium 8.4 (L) 8.5 - 10.1 MG/DL    Bilirubin, total 0.5 0.2 - 1.0 MG/DL    ALT (SGPT) 37 12 - 78 U/L    AST (SGOT) 46 (H) 15 - 37 U/L    Alk.  phosphatase 90 45 - 117 U/L    Protein, total 5.9 (L) 6.4 - 8.2 g/dL    Albumin 2.8 (L) 3.5 - 5.0 g/dL    Globulin 3.1 2.0 - 4.0 g/dL    A-G Ratio 0.9 (L) 1.1 - 2.2     TROPONIN I   Result Value Ref Range    Troponin-I, Qt. <0.04 <3.27 ng/mL   METABOLIC PANEL, COMPREHENSIVE   Result Value Ref Range    Sodium 145 136 - 145 mmol/L    Potassium 3.4 (L) 3.5 - 5.1 mmol/L    Chloride 105 97 - 108 mmol/L    CO2 30 21 - 32 mmol/L    Anion gap 10 5 - 15 mmol/L    Glucose 334 (H) 65 - 100 mg/dL    BUN 17 6 - 20 MG/DL    Creatinine 1.09 (H) 0.55 - 1.02 MG/DL    BUN/Creatinine ratio 16 12 - 20      GFR est AA 59 (L) >60 ml/min/1.73m2    GFR est non-AA 49 (L) >60 ml/min/1.73m2    Calcium 8.2 (L) 8.5 - 10.1 MG/DL    Bilirubin, total 0.3 0.2 - 1.0 MG/DL    ALT (SGPT) 33 12 - 78 U/L    AST (SGOT) 35 15 - 37 U/L    Alk.  phosphatase 94 45 - 117 U/L    Protein, total 6.1 (L) 6.4 - 8.2 g/dL    Albumin 2.8 (L) 3.5 - 5.0 g/dL    Globulin 3.3 2.0 - 4.0 g/dL    A-G Ratio 0.8 (L) 1.1 - 2.2     MAGNESIUM   Result Value Ref Range    Magnesium 1.8 1.6 - 2.4 mg/dL   CBC W/O DIFF   Result Value Ref Range    WBC 1.6 (L) 3.6 - 11.0 K/uL    RBC 4.08 3.80 - 5.20 M/uL    HGB 13.8 11.5 - 16.0 g/dL    HCT 41.4 35.0 - 47.0 %    .5 (H) 80.0 - 99.0 FL    MCH 33.8 26.0 - 34.0 PG    MCHC 33.3 30.0 - 36.5 g/dL    RDW 12.1 11.5 - 14.5 %    PLATELET 939 (L) 468 - 400 K/uL    MPV 10.1 8.9 - 12.9 FL    NRBC 0.0 0  WBC    ABSOLUTE NRBC 0.00 0.00 - 0.01 K/uL   CBC WITH AUTOMATED DIFF   Result Value Ref Range    WBC 6.8 3.6 - 11.0 K/uL    RBC 3.98 3.80 - 5.20 M/uL    HGB 13.3 11.5 - 16.0 g/dL    HCT 40.3 35.0 - 47.0 %    .3 (H) 80.0 - 99.0 FL    MCH 33.4 26.0 - 34.0 PG    MCHC 33.0 30.0 - 36.5 g/dL    RDW 12.2 11.5 - 14.5 %    PLATELET 259 660 - 059 K/uL    MPV 10.3 8.9 - 12.9 FL    NRBC 0.0 0  WBC    ABSOLUTE NRBC 0.00 0.00 - 0.01 K/uL    NEUTROPHILS 88 (H) 32 - 75 %    LYMPHOCYTES 6 (L) 12 - 49 %    MONOCYTES 6 5 - 13 %    EOSINOPHILS 0 0 - 7 %    BASOPHILS 0 0 - 1 %    IMMATURE GRANULOCYTES 1 (H) 0.0 - 0.5 %    ABS. NEUTROPHILS 5.9 1.8 - 8.0 K/UL    ABS. LYMPHOCYTES 0.4 (L) 0.8 - 3.5 K/UL    ABS. MONOCYTES 0.4 0.0 - 1.0 K/UL    ABS. EOSINOPHILS 0.0 0.0 - 0.4 K/UL    ABS. BASOPHILS 0.0 0.0 - 0.1 K/UL    ABS. IMM.  GRANS. 0.1 (H) 0.00 - 0.04 K/UL    DF AUTOMATED     MAGNESIUM   Result Value Ref Range    Magnesium 2.0 1.6 - 2.4 mg/dL   METABOLIC PANEL, BASIC   Result Value Ref Range    Sodium 144 136 - 145 mmol/L    Potassium 5.4 (H) 3.5 - 5.1 mmol/L    Chloride 109 (H) 97 - 108 mmol/L    CO2 28 21 - 32 mmol/L    Anion gap 7 5 - 15 mmol/L    Glucose 150 (H) 65 - 100 mg/dL    BUN 23 (H) 6 - 20 MG/DL    Creatinine 0.90 0.55 - 1.02 MG/DL    BUN/Creatinine ratio 26 (H) 12 - 20      GFR est AA >60 >60 ml/min/1.73m2    GFR est non-AA >60 >60 ml/min/1.73m2    Calcium 9.5 8.5 - 10.1 MG/DL   PHOSPHORUS   Result Value Ref Range    Phosphorus 3.8 2.6 - 4.7 MG/DL   CBC W/O DIFF   Result Value Ref Range    WBC 7.9 3.6 - 11.0 K/uL    RBC 3.97 3.80 - 5.20 M/uL    HGB 13.2 11.5 - 16.0 g/dL    HCT 40.6 35.0 - 47.0 %    .3 (H) 80.0 - 99.0 FL    MCH 33.2 26.0 - 34.0 PG    MCHC 32.5 30.0 - 36.5 g/dL    RDW 12.4 11.5 - 14.5 %    PLATELET 216 698 - 082 K/uL    MPV 10.3 8.9 - 12.9 FL    NRBC 0.0 0  WBC    ABSOLUTE NRBC 0.00 0.00 - 0.01 K/uL   MAGNESIUM   Result Value Ref Range    Magnesium 2.1 1.6 - 2.4 mg/dL   METABOLIC PANEL, BASIC   Result Value Ref Range    Sodium 146 (H) 136 - 145 mmol/L    Potassium 4.9 3.5 - 5.1 mmol/L    Chloride 111 (H) 97 - 108 mmol/L    CO2 29 21 - 32 mmol/L    Anion gap 6 5 - 15 mmol/L    Glucose 160 (H) 65 - 100 mg/dL    BUN 27 (H) 6 - 20 MG/DL    Creatinine 0.91 0.55 - 1.02 MG/DL    BUN/Creatinine ratio 30 (H) 12 - 20      GFR est AA >60 >60 ml/min/1.73m2    GFR est non-AA 60 (L) >60 ml/min/1.73m2    Calcium 9.5 8.5 - 10.1 MG/DL   PHOSPHORUS   Result Value Ref Range    Phosphorus 4.1 2.6 - 4.7 MG/DL   EKG, 12 LEAD, INITIAL   Result Value Ref Range    Ventricular Rate 67 BPM    Atrial Rate 67 BPM    P-R Interval 138 ms    QRS Duration 78 ms    Q-T Interval 394 ms    QTC Calculation (Bezet) 416 ms    Calculated P Axis 69 degrees    Calculated R Axis 70 degrees    Calculated T Axis 67 degrees    Diagnosis       Normal sinus rhythm  Possible Left atrial enlargement  Borderline ECG  No previous ECGs available  Confirmed by Kirby Hoyt MD., Bridgett Malloy ([id number]) on 2/9/2018 9:47:56 PM         Physical Exam:     Visit Vitals  /68   Pulse 64   Resp 20   Ht 5' 1.5\" (1.562 m)   Wt 45.8 kg (101 lb)   SpO2 95%   BMI 18.77 kg/m²     General:  Alert, cooperative, no distress. Head:  Normocephalic, without obvious abnormality, atraumatic. Eyes:  Conjunctivae/corneas clear. Lungs:  Heart:   Non labored breathing  Regular rate and rhythm, no carotid bruits   Abdomen:   Soft, non-distended   Extremities: Extremities normal, atraumatic, no cyanosis or edema. Pulses: 2+ and symmetric all extremities. Skin: Skin color, texture, turgor normal. No rashes or lesions.   Neurologic Exam     Gen: MMSE 28/30 Attention normal  Oriented x 3             Language: naming, repetition, fluency normal             Memory: intact recent and remote memory  Problem with registration and copying  Cranial Nerves:  I: smell Not tested   II: visual fields Full to confrontation   II: pupils Equal, round, reactive to light   II: optic disc No papilledema   III,VII: ptosis none   III,IV,VI: extraocular muscles  Full ROM   V: mastication normal   V: facial light touch sensation  normal   VII: facial muscle function   symmetric   VIII: hearing symmetric   IX: soft palate elevation  normal   XI: trapezius strength  5/5   XI: sternocleidomastoid strength 5/5   XI: neck flexion strength  5/5   XII: tongue  midline     Motor: normal bulk and tone, no tremor              Strength: 5/5 all four extremities  Sensory: intact to LT, PP, vibration, and JPS  Reflexes: 2+ throughout; Down going toes  Coordination: Good FTN and HTS  Gait: slightly wide based gait with problem walking heel to toe           Impression:     Rian Carrillo is a 77 y.o. female who  has a past medical history of Asthma, Chronic obstructive pulmonary disease ([address] COPD with asthma ([address] Headache, and Musculoskeletal disorder. who for the past 3 months, noted cognitive decline described as problem with computers and having problem doing newsletters, not recognizing keys in the keyboard and not processing, word finding difficulty, problem with names and faces, not driving, started to be hoarding, needs help with finances. MMSE is 28/30. Patient should be evaluated for dementia. 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 (-) tongue bite (-) incontinence. Consideration includes seizure. Patient should be evaluated for structural, inflammatory and metabolic causes of her symptoms. RECOMMENDATIONS  1. I had a long discussion with patient,  and daughter-in-law. Discussed diagnosis, prognosis, pathophysiology and available treatment. All questions were answered. 2. MRI brain with SHEILA to evaluate for structural cause of seizure and dementia. Patient to call for results  3. Blood test for Vit B12, Folate, TSH, ESR, HAILEY  4. Sleep deprived EEG  5. Neuropsychological testing  6. Depending on above, will consider trial of Aricept    Follow-up Disposition:  Return for review of results.       Thank you for the consultation      Raf Arora MD  Diplomate, American Board of Psychiatry and Neurology  Diplomate, Neuromuscular Medicine  Diplomate, American Board of Electrodiagnostic Medicine        CC: Fiorella Woods MD  Fax: [phone]

## 2019-03-11 NOTE — PROGRESS NOTES
Usually when she wakes up in the morning with a headache her right eye will be droopy- per the patient she doesn't wake up with many headaches   She takes an anxiety medication that is supposed to be very natural- takes 2 of them every morning     If she feels like she has an headache she will take Advil- takes away most of the pain from the headache     The headaches are just something that started about 1.5 months ago     Memory issues- she has to think a lot bit harder on what she is trying to remember, not quite as alert as she used to be     Has been under abnormal amounts of stress in the last year and half   Her balance while ambulating is better some days than others, no falls  She is having to catch herself on things like furniture, its gets worse in the evenings when she gets tired       l

## 2019-03-12 ENCOUNTER — OFFICE VISIT (OUTPATIENT)
Dept: NEUROLOGY | Age: 79
End: 2019-03-12

## 2019-03-12 DIAGNOSIS — F10.21 HISTORY OF ALCOHOL DEPENDENCE (HCC): ICD-10-CM

## 2019-03-12 DIAGNOSIS — R41.89 DIFFICULTY PROCESSING INFORMATION: ICD-10-CM

## 2019-03-12 DIAGNOSIS — R47.89 WORD FINDING DIFFICULTY: ICD-10-CM

## 2019-03-12 DIAGNOSIS — R53.83 OTHER FATIGUE: ICD-10-CM

## 2019-03-12 DIAGNOSIS — R41.89 COGNITIVE DECLINE: ICD-10-CM

## 2019-03-12 DIAGNOSIS — F41.9 CHRONIC ANXIETY: ICD-10-CM

## 2019-03-12 DIAGNOSIS — Z63.79 STRESS DUE TO ILLNESS OF FAMILY MEMBER: ICD-10-CM

## 2019-03-12 DIAGNOSIS — G31.84 MILD COGNITIVE IMPAIRMENT: Primary | ICD-10-CM

## 2019-03-12 DIAGNOSIS — R41.840 ATTENTION DEFICIT: ICD-10-CM

## 2019-03-12 DIAGNOSIS — F42.3 HOARDING BEHAVIOR: ICD-10-CM

## 2019-03-12 NOTE — PROGRESS NOTES
[address] [facility],[address]  [address]   [place] [address] [place] [facility]   [address]   [phone] Office   [phone] Fax      Neuropsychology    Initial Diagnostic Interview Note      Referral:  Bri Camara MD, . Jonn Henderson is a 77 y.o. left handed   female who was accompanied by her spouse and daughter in law to the initial clinical interview on 3/12/19. Please refer to her medical records for details pertaining to her history. Briefly, the patient reported that she completed high school followed by Nursing School at [employer] without history of previously diagnosed LD and/or receipt of special education. She is a retired surgical nurse. She has noticed a progressive decline in short term memory. She forgets the content of conversations. Misplaces things. Starts tasks and does not complete. She is more easily distracted. 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 She has had problems including difficulties using computers, not recognizing the keys, not processing. She struggles with remembering names, and has difficulties recalling faces. She loses words in conversations. She has started to elma. She gets help with finances. She is no longer driving. [place] and had noticed her eyes rolling and she became unresponsive for several seconds and then returned to baseline. She was not bowel or bladder incontinent when this happened. Did not bite her tongue. She is describe as being more agitated at times. She is generally sleeping okay, but is much more fatigued. No background stroke, meningitis/encephalitis, MAU Fever, Lupus, Lyme, or TBI. She is more weak, and her gait and balance is off. Day-to-day stress is reported. She has no new medical issues or medication changes. She has no known family history of dementia.   Spouse has definitely noticed a decline. She has a lot of stress overload, which is dealing with their business, which is mentally demanding - they are in multi level marketing. Her long term memory is fine. About three years ago, her brother became medically disabled, lives in [place], and his mind is functioning but physically is not. He is reliant on two dysfunctional individual, and patient is very concerned about this and stressed about this. Brother's spouse  of OD, as did her child. Patient is driving 2-3 times a week to go over there. Stopped driving about a month ago due to physical weakness and burnout. She has frequent headaches on the right temporal lobe. Updated MRI has been ordered. Headaches ongoing for about a year. Has not noticed any changes in sense of taste or smell. She has lost a lot of weight and appetite is terrible. 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 No counseling or psychiatrist.  No current legal troubles. She has a history of hoarding x 15 years, and every room is used as a closet, home is 7000 square feet. Her closet is 20 feet long and full of things she won't use, and there is stuff everywhere. She admits it is out of control but has not yet been willing to have anyone come in and clean things out. They made some dents in this awhile back but is back to the way it was. Threw out 50 bags of things and now more has returned. Her father passed away when she was age 11. Had a hard time when her mother passed away. She is status post bypass through liver, and she had cirrhosis of liver. She was a closet drinker    MRI from :  RT:  CLINICAL INDICATION: Hearing loss right side.     Technical factors: Diffusion imaging, sagittal T1-weighted, axial   T1-weighted pre- Post 9 mL IV Magnevist T2 and FLAIR axial gradient-echo   temporal bones axial and coronal T1-weighted temporal bones pre- and postcontrast.    No prior. There is prominent atrophy and white matter disease. Temporal bones do   appear unremarkable, diffusion imaging is negative. There is no enhancing   lesion shift or mass effect.          IMPRESSION: Prominent atrophy and white matter disease.      A new MRI has been ordered and pending. She has not had neuropsych testing in the past,but did pass a psych evaluation when she was going through the transplant process 8-10 years ago. Neuropsychological Mental Status Exam (NMSE):  Historian: Good  Praxis: No UE apraxia  R/L Orientation: Intact to self and to other  Dress: within normal limits   Weight: within normal limits   Appearance/Hygiene: within normal limits   Gait: Slow, unassisted  Assistive Devices: None  Mood: within normal limits   Affect: within normal limits   Comprehension: within normal limits   Thought Process: logical and goal oriented but slow to process and gather her thoughts. Expressive Language: Slow rate of speech   Receptive Language: within normal limits , but deaf in right ear  Motor:  No cognitive or motor perseveration  ETOH: 1/2 glass of wine a night now. She was a closet drinker and has cirrhosis of liver, stent. Tobacco:  6 cigarettes or so a day, she has cut back, counseling to quit given  Illicit: Denied   SI/HI: Denied  Psychosis: Denied    Insight: Within normal limits  Judgment: Within normal limits  Other Psych:      Past Medical History:   Diagnosis Date    Asthma     Chronic obstructive pulmonary disease ([address]     COPD with asthma ([address]     Headache     Musculoskeletal disorder        History reviewed. No pertinent surgical history.     Allergies   Allergen Reactions    Latex Hives    Pcn [Penicillins] Hives       Family History   Problem Relation Age of Onset    Heart Disease Brother     Heart Disease Father        Social History     Tobacco Use    Smoking status: Current Every Day Smoker   Substance Use Topics    Alcohol use: No    Drug use: No       Current Outpatient Medications   Medication Sig Dispense Refill    zolpidem (AMBIEN) 5 mg tablet TK 1 T PO HS PRN  0    magnesium 30 mg tab Take  by mouth.  cholecalciferol, vitamin D3, (VITAMIN D3 PO) Take  by mouth.  predniSONE (DELTASONE) 10 mg tablet Prednisone 10mg tabs:  4 tabs (40 mg) daily x 3 days, then  3 tabs (30 mg) daily x 3 days, then  2 tabs (20 mg) daily x 3 days, then  1 tab   (10 mg) daily until gone. Dispense 30 tabs 30 Tab 0    benzonatate (TESSALON PERLES) 100 mg capsule Take 100 mg by mouth three (3) times daily as needed for Cough.  albuterol (VENTOLIN HFA) 90 mcg/actuation inhaler Take 2 Puffs by inhalation every four (4) hours as needed for Wheezing. Plan:  Obtain authorization for testing from insurance company. Report to follow once testing, scoring, and interpretation completed. ? Organic based neurocognitive issues versus mood disorder or combination of same. ? Problems organic, functional, or both? This note will not be viewable in [address] [address] NSE 45 minutes with patient and [id number] x 1 60 minutes reviewing all of her records in the chart. 77year old with cognitive and emotional concerns as noted above. Neurology referred for eval of organic based cognitive concerns versus mood or combination of same.

## 2019-03-13 LAB
ANA SER QL: POSITIVE
CENTROMERE B AB SER-ACNC: <0.2 AI (ref 0–0.9)
CHROMATIN AB SERPL-ACNC: <0.2 AI (ref 0–0.9)
DSDNA AB SER-ACNC: <1 IU/ML (ref 0–9)
ENA JO1 AB SER-ACNC: <0.2 AI (ref 0–0.9)
ENA RNP AB SER-ACNC: 1 AI (ref 0–0.9)
ENA SCL70 AB SER-ACNC: <0.2 AI (ref 0–0.9)
ENA SM AB SER-ACNC: <0.2 AI (ref 0–0.9)
ENA SS-A AB SER-ACNC: <0.2 AI (ref 0–0.9)
ENA SS-B AB SER-ACNC: <0.2 AI (ref 0–0.9)
ERYTHROCYTE [SEDIMENTATION RATE] IN BLOOD BY WESTERGREN METHOD: 3 MM/HR (ref 0–40)
FOLATE SERPL-MCNC: 18 NG/ML
SEE BELOW, 164869: ABNORMAL
TSH SERPL DL<=0.005 MIU/L-ACNC: 2.47 UIU/ML (ref 0.45–4.5)
VIT B12 SERPL-MCNC: 719 PG/ML (ref 232–1245)

## 2019-03-16 ENCOUNTER — HOSPITAL ENCOUNTER (EMERGENCY)
Age: 79
Discharge: ANOTHER HOSPITAL | End: 2019-03-16
Attending: EMERGENCY MEDICINE
Payer: MEDICARE

## 2019-03-16 ENCOUNTER — HOSPITAL ENCOUNTER (INPATIENT)
Age: 79
LOS: 5 days | Discharge: NURSING, REHAB OR LONG-TERM CARE | DRG: 064 | End: 2019-03-21
Attending: INTERNAL MEDICINE | Admitting: INTERNAL MEDICINE
Payer: MEDICARE

## 2019-03-16 ENCOUNTER — APPOINTMENT (OUTPATIENT)
Dept: GENERAL RADIOLOGY | Age: 79
End: 2019-03-16
Attending: EMERGENCY MEDICINE
Payer: MEDICARE

## 2019-03-16 ENCOUNTER — APPOINTMENT (OUTPATIENT)
Dept: CT IMAGING | Age: 79
DRG: 064 | End: 2019-03-16
Attending: SPECIALIST
Payer: MEDICARE

## 2019-03-16 ENCOUNTER — APPOINTMENT (OUTPATIENT)
Dept: CT IMAGING | Age: 79
End: 2019-03-16
Attending: EMERGENCY MEDICINE
Payer: MEDICARE

## 2019-03-16 VITALS
SYSTOLIC BLOOD PRESSURE: 153 MMHG | OXYGEN SATURATION: 95 % | RESPIRATION RATE: 24 BRPM | TEMPERATURE: 98.2 F | DIASTOLIC BLOOD PRESSURE: 50 MMHG | HEART RATE: 74 BPM

## 2019-03-16 DIAGNOSIS — I62.9 INTRACRANIAL HEMORRHAGE (HCC): Primary | ICD-10-CM

## 2019-03-16 PROBLEM — I63.9 STROKE (HCC): Status: ACTIVE | Noted: 2019-03-16

## 2019-03-16 LAB
ALBUMIN SERPL-MCNC: 3.4 G/DL (ref 3.5–5)
ALBUMIN/GLOB SERPL: 1 {RATIO} (ref 1.1–2.2)
ALP SERPL-CCNC: 87 U/L (ref 45–117)
ALT SERPL-CCNC: 23 U/L (ref 12–78)
ANION GAP SERPL CALC-SCNC: 6 MMOL/L (ref 5–15)
APPEARANCE UR: CLEAR
APTT PPP: 24.3 SEC (ref 22.1–32)
AST SERPL-CCNC: 21 U/L (ref 15–37)
BACTERIA URNS QL MICRO: NEGATIVE /HPF
BASOPHILS # BLD: 0 K/UL (ref 0–0.1)
BASOPHILS NFR BLD: 0 % (ref 0–1)
BILIRUB SERPL-MCNC: 0.9 MG/DL (ref 0.2–1)
BILIRUB UR QL: NEGATIVE
BUN SERPL-MCNC: 15 MG/DL (ref 6–20)
BUN/CREAT SERPL: 17 (ref 12–20)
CALCIUM SERPL-MCNC: 9.8 MG/DL (ref 8.5–10.1)
CHLORIDE SERPL-SCNC: 105 MMOL/L (ref 97–108)
CO2 SERPL-SCNC: 33 MMOL/L (ref 21–32)
COLOR UR: ABNORMAL
CREAT SERPL-MCNC: 0.88 MG/DL (ref 0.55–1.02)
DIFFERENTIAL METHOD BLD: ABNORMAL
EOSINOPHIL # BLD: 0 K/UL (ref 0–0.4)
EOSINOPHIL NFR BLD: 0 % (ref 0–7)
EPITH CASTS URNS QL MICRO: ABNORMAL /LPF
ERYTHROCYTE [DISTWIDTH] IN BLOOD BY AUTOMATED COUNT: 12.1 % (ref 11.5–14.5)
GLOBULIN SER CALC-MCNC: 3.3 G/DL (ref 2–4)
GLUCOSE SERPL-MCNC: 131 MG/DL (ref 65–100)
GLUCOSE UR STRIP.AUTO-MCNC: NEGATIVE MG/DL
HCT VFR BLD AUTO: 43.3 % (ref 35–47)
HGB BLD-MCNC: 14.4 G/DL (ref 11.5–16)
HGB UR QL STRIP: NEGATIVE
HYALINE CASTS URNS QL MICRO: ABNORMAL /LPF (ref 0–5)
IMM GRANULOCYTES # BLD AUTO: 0 K/UL (ref 0–0.04)
IMM GRANULOCYTES NFR BLD AUTO: 0 % (ref 0–0.5)
INR PPP: 1.1 (ref 0.9–1.1)
KETONES UR QL STRIP.AUTO: NEGATIVE MG/DL
LEUKOCYTE ESTERASE UR QL STRIP.AUTO: NEGATIVE
LYMPHOCYTES # BLD: 0.6 K/UL (ref 0.8–3.5)
LYMPHOCYTES NFR BLD: 7 % (ref 12–49)
MCH RBC QN AUTO: 34.1 PG (ref 26–34)
MCHC RBC AUTO-ENTMCNC: 33.3 G/DL (ref 30–36.5)
MCV RBC AUTO: 102.6 FL (ref 80–99)
MONOCYTES # BLD: 0.3 K/UL (ref 0–1)
MONOCYTES NFR BLD: 4 % (ref 5–13)
NEUTS SEG # BLD: 7.6 K/UL (ref 1.8–8)
NEUTS SEG NFR BLD: 89 % (ref 32–75)
NITRITE UR QL STRIP.AUTO: NEGATIVE
NRBC # BLD: 0 K/UL (ref 0–0.01)
NRBC BLD-RTO: 0 PER 100 WBC
PH UR STRIP: 7 [PH] (ref 5–8)
PLATELET # BLD AUTO: 264 K/UL (ref 150–400)
PMV BLD AUTO: 9.9 FL (ref 8.9–12.9)
POTASSIUM SERPL-SCNC: 3.3 MMOL/L (ref 3.5–5.1)
PROT SERPL-MCNC: 6.7 G/DL (ref 6.4–8.2)
PROT UR STRIP-MCNC: ABNORMAL MG/DL
PROTHROMBIN TIME: 11.4 SEC (ref 9–11.1)
RBC # BLD AUTO: 4.22 M/UL (ref 3.8–5.2)
RBC #/AREA URNS HPF: ABNORMAL /HPF (ref 0–5)
RBC MORPH BLD: ABNORMAL
SODIUM SERPL-SCNC: 144 MMOL/L (ref 136–145)
SP GR UR REFRACTOMETRY: 1.02 (ref 1–1.03)
THERAPEUTIC RANGE,PTTT: NORMAL SECS (ref 58–77)
UA: UC IF INDICATED,UAUC: ABNORMAL
UROBILINOGEN UR QL STRIP.AUTO: 0.2 EU/DL (ref 0.2–1)
WBC # BLD AUTO: 8.5 K/UL (ref 3.6–11)
WBC URNS QL MICRO: ABNORMAL /HPF (ref 0–4)

## 2019-03-16 PROCEDURE — 70450 CT HEAD/BRAIN W/O DYE: CPT

## 2019-03-16 PROCEDURE — 74011250636 HC RX REV CODE- 250/636: Performed by: INTERNAL MEDICINE

## 2019-03-16 PROCEDURE — 36415 COLL VENOUS BLD VENIPUNCTURE: CPT

## 2019-03-16 PROCEDURE — 85025 COMPLETE CBC W/AUTO DIFF WBC: CPT

## 2019-03-16 PROCEDURE — 99285 EMERGENCY DEPT VISIT HI MDM: CPT

## 2019-03-16 PROCEDURE — 74011250636 HC RX REV CODE- 250/636: Performed by: EMERGENCY MEDICINE

## 2019-03-16 PROCEDURE — 96374 THER/PROPH/DIAG INJ IV PUSH: CPT

## 2019-03-16 PROCEDURE — 74011250636 HC RX REV CODE- 250/636: Performed by: SPECIALIST

## 2019-03-16 PROCEDURE — 80053 COMPREHEN METABOLIC PANEL: CPT

## 2019-03-16 PROCEDURE — 85730 THROMBOPLASTIN TIME PARTIAL: CPT

## 2019-03-16 PROCEDURE — 81001 URINALYSIS AUTO W/SCOPE: CPT

## 2019-03-16 PROCEDURE — 71045 X-RAY EXAM CHEST 1 VIEW: CPT

## 2019-03-16 PROCEDURE — 85610 PROTHROMBIN TIME: CPT

## 2019-03-16 PROCEDURE — 96375 TX/PRO/DX INJ NEW DRUG ADDON: CPT

## 2019-03-16 PROCEDURE — 65610000006 HC RM INTENSIVE CARE

## 2019-03-16 PROCEDURE — 87086 URINE CULTURE/COLONY COUNT: CPT

## 2019-03-16 PROCEDURE — 77030034850

## 2019-03-16 RX ORDER — LORAZEPAM 2 MG/ML
1 INJECTION INTRAMUSCULAR ONCE
Status: COMPLETED | OUTPATIENT
Start: 2019-03-16 | End: 2019-03-16

## 2019-03-16 RX ORDER — LABETALOL HCL 20 MG/4 ML
10 SYRINGE (ML) INTRAVENOUS
Status: COMPLETED | OUTPATIENT
Start: 2019-03-16 | End: 2019-03-16

## 2019-03-16 RX ORDER — SODIUM CHLORIDE 450 MG/100ML
75 INJECTION, SOLUTION INTRAVENOUS CONTINUOUS
Status: DISCONTINUED | OUTPATIENT
Start: 2019-03-16 | End: 2019-03-16

## 2019-03-16 RX ORDER — SODIUM CHLORIDE 0.9 % (FLUSH) 0.9 %
5-40 SYRINGE (ML) INJECTION AS NEEDED
Status: DISCONTINUED | OUTPATIENT
Start: 2019-03-16 | End: 2019-03-21 | Stop reason: HOSPADM

## 2019-03-16 RX ORDER — HYDRALAZINE HYDROCHLORIDE 20 MG/ML
20 INJECTION INTRAMUSCULAR; INTRAVENOUS
Status: COMPLETED | OUTPATIENT
Start: 2019-03-16 | End: 2019-03-16

## 2019-03-16 RX ORDER — SODIUM CHLORIDE 9 MG/ML
100 INJECTION, SOLUTION INTRAVENOUS CONTINUOUS
Status: DISCONTINUED | OUTPATIENT
Start: 2019-03-16 | End: 2019-03-18

## 2019-03-16 RX ORDER — SODIUM CHLORIDE 0.9 % (FLUSH) 0.9 %
5-40 SYRINGE (ML) INJECTION EVERY 8 HOURS
Status: DISCONTINUED | OUTPATIENT
Start: 2019-03-16 | End: 2019-03-21 | Stop reason: HOSPADM

## 2019-03-16 RX ADMIN — HYDRALAZINE HYDROCHLORIDE 20 MG: 20 INJECTION INTRAMUSCULAR; INTRAVENOUS at 17:54

## 2019-03-16 RX ADMIN — LABETALOL 20 MG/4 ML (5 MG/ML) INTRAVENOUS SYRINGE 10 MG: at 17:15

## 2019-03-16 RX ADMIN — LORAZEPAM 1 MG: 2 INJECTION INTRAMUSCULAR; INTRAVENOUS at 21:47

## 2019-03-16 RX ADMIN — SODIUM CHLORIDE 250 ML: 900 INJECTION, SOLUTION INTRAVENOUS at 17:17

## 2019-03-16 RX ADMIN — Medication 10 ML: at 21:47

## 2019-03-16 RX ADMIN — SODIUM CHLORIDE 100 ML/HR: 900 INJECTION, SOLUTION INTRAVENOUS at 22:28

## 2019-03-16 NOTE — ED TRIAGE NOTES
Pt presents via EMS for generalized weakness and lethargy that started today. Reports pt was recently diagnosed with UTI, and currently being evaluated for dementia.

## 2019-03-16 NOTE — ED PROVIDER NOTES
77 y.o. female with past medical history significant for COPD, asthma, musculoskeletal disorder, who presents to the ED via EMS, with chief complaint of lethargy and bladder infection. EMS personnel report that the patient was diagnosed with a UTI and well called to her home because her symptoms are worsening. They state that the patient has been \"unable to get up from bed or move her arms\" and has been more \"lethargic and fatigued\". Spouse reports that patient is undergoing testing for dementia, stating patient has been on a \"significant lengthy decline\". He states that patient is significantly more confused since yesterday when compared to last week. Pt does not know where she is, and believes the current year to be 1929 during exam. Spouse notes that patient was able to answer those questions correctly last week. He also notes that patient has exhibited diarrhea, was \"choking\" this week, and stated that she \"felt sick\", but he is unsure if the patient vomited. Spouse also notes that patient has been experiencing \"leg cramping\" once a week that she has been treating with \"magnesium supplements\" and pain in her back from \"partial L1 compression\". Reports that it has been hard to feed or give fluid to patient. There are no other acute medical concerns at this time. Social hx: Positive for Tobacco use (current every day smoker); Negative for EtOH use; Negative for Illicit Drug use  PCP: Ling Wadsworth MD    Full history, physical exam, and ROS unable to be obtained due to:  dementia. Note written by Vern Shaver, as dictated by Anita Ayala MD 3:56 PM      The history is provided by medical records, the EMS personnel, the spouse and the patient. The history is limited by the condition of the patient (dementia). No  was used.         Past Medical History:   Diagnosis Date    Asthma     Chronic obstructive pulmonary disease ([facility] [facility]     COPD with asthma ([facility] [facility]     Headache     Musculoskeletal disorder        No past surgical history on file. Family History:   Problem Relation Age of Onset    Heart Disease Brother     Heart Disease Father        Social History     Socioeconomic History    Marital status:      Spouse name: Not on file    Number of children: Not on file    Years of education: Not on file    Highest education level: Not on file   Social Needs    Financial resource strain: Not on file    Food insecurity - worry: Not on file    Food insecurity - inability: Not on file   [employer] needs - medical: Not on file   [employer] needs - non-medical: Not on file   Occupational History    Not on file   Tobacco Use    Smoking status: Current Every Day Smoker   Substance and Sexual Activity    Alcohol use: No    Drug use: No    Sexual activity: Not on file   Other Topics Concern    Not on file   Social History Narrative    Not on file         ALLERGIES: Latex and Pcn [penicillins]    Review of Systems   Unable to perform ROS: Dementia       Vitals:    03/16/19 1608   BP: (!) 143/127   Pulse: (!) 56   Resp: 19   Temp: 98.2 °F (36.8 °C)   SpO2: 95%            Physical Exam   Constitutional: No distress. Elderly, Frail. HENT:   Head: Normocephalic and atraumatic. Mouth/Throat: Mucous membranes are dry. Eyes: Conjunctivae are normal. Pupils are equal, round, and reactive to light. No scleral icterus. Neck: Neck supple. No tracheal deviation present. Cardiovascular: Normal rate, regular rhythm and intact distal pulses. Pulmonary/Chest: Effort normal. No respiratory distress. She has no wheezes. She has no rales. Abdominal: Soft. She exhibits no distension. There is no tenderness. Genitourinary:   Genitourinary Comments: deferred   Musculoskeletal: She exhibits no edema or deformity. Neurological: She is alert. Oriented to person only. Skin: Skin is warm and dry. Nursing note and vitals reviewed.   Note written by Viona Coma, Scribe, as dictated by Maame Maldonado MD 3:56 PM    [facility]       Procedures    CONSULT NOTE:  5:10 PM Maame Maldonado MD spoke with Dr. Manisha Bray MD, Consult for Neurosurgery. Discussed available diagnostic tests and clinical findings. Dr. Joe Hernandez recommends transferring the patient to [facility] and admit her to hospitalist. He will see her there as a consult. 5:19 PM  Patient is being admitted to the hospital.  The results of their tests and reasons for their admission have been discussed with them and/or available family. They convey agreement and understanding for the need to be admitted and for their admission diagnosis. Consultation has been made with the inpatient physician specialist for hospitalization. LABORATORY TESTS:  Labs Reviewed   CBC WITH AUTOMATED DIFF - Abnormal; Notable for the following components:       Result Value    .6 (*)     MCH 34.1 (*)     All other components within normal limits   METABOLIC PANEL, COMPREHENSIVE - Abnormal; Notable for the following components:    Potassium 3.3 (*)     CO2 33 (*)     Glucose 131 (*)     Albumin 3.4 (*)     A-G Ratio 1.0 (*)     All other components within normal limits   URINE CULTURE HOLD SAMPLE   PTT   PROTHROMBIN TIME + INR   URINALYSIS W/MICROSCOPIC       IMAGING RESULTS:  Ct Head Wo Cont    Result Date: 3/16/2019  EXAM: CT HEAD WO CONT Clinical history: Confusion, delirium and loss of consciousness. INDICATION: Confusion/delirium, altered LOC, unexplained COMPARISON: None. CONTRAST: None. TECHNIQUE: Unenhanced CT of the head was performed using 5 mm images. Brain and bone windows were generated. CT dose reduction was achieved through use of a standardized protocol tailored for this examination and automatic exposure control for dose modulation. FINDINGS: There is a tiny focus of subarachnoid hemorrhage identified. There are foci of intraventricular hemorrhage as well.  Vasogenic edema in the posterior right temporal-occipital region with associated minimal hemorrhage. . There is no midline shift or mass effect. . Third and fourth ventricles remain patent. No evidence of tonsillar herniation. No midline shift. IMPRESSION: Left greater than right intraventricular hemorrhage. Parenchymal hemorrhage and vasogenic edema in the right temporal region, trace subarachnoid hemorrhage as well. Possible mass lesion in the right posterior temporal right occipital region The findings were called to Dr. Noman Chaudhari on 3/16/2019 at 4:52 PM by Dr. Lorena Barron. [address] [facility]    Result Date: 3/16/2019  Indication: Lethargy, generalized weakness Comparison: 2/9/2018 Portable exam of the chest obtained at 1651 demonstrates normal heart size. There is no acute process in the lung fields. The osseous structures are unremarkable. Impression: No acute process. MEDICATIONS GIVEN:  Medications   sodium chloride 0.9 % bolus infusion 1,000 mL (250 mL IntraVENous New Bag 3/16/19 1717)   labetalol (NORMODYNE;TRANDATE) 20 mg/4 mL (5 mg/mL) injection 10 mg (10 mg IntraVENous Given 3/16/19 1715)       IMPRESSION:  1. Intracranial hemorrhage ([address]        PLAN:  1. Admit to [facility] hospitalist  2. Neurosurgery consult  3. BP control SBP< 140    Total critical care time spent exclusive of procedures:  44 minutes      Fabián Yu MD

## 2019-03-16 NOTE — PROGRESS NOTES
1812 - TRANSFER - IN REPORT:    Verbal report received from Ilana Guerra RN(name) on Donnice Osgood  being received from [address] [place] [address] ED(unit) for urgent transfer      Report consisted of patients Situation, Background, Assessment and   Recommendations(SBAR). Information from the following report(s) SBAR and ED Summary was reviewed with the receiving nurse. Opportunity for questions and clarification was provided. Assessment completed upon patients arrival to unit and care assumed. 1915 - Pt. Arrived via transport from [address] [place] [address]. Dr. Lluvia Espinosa and hospitalist aware of pt. Arrival. Awaiting orders. Pt. Combative and screaming to have us leave her alone. Does move all of her extremities.  and daughter-in-law at the bedside. 1930 - Bedside shift change report given to [place] (oncoming nurse) by Addie Artis RN (offgoing nurse). Report included the following information SBAR, ED Summary and Cardiac Rhythm NSR.

## 2019-03-16 NOTE — ED NOTES
TRANSFER - OUT REPORT:    Verbal report given to [facility], RN(name) on Major Surinder  being transferred to [address](unit) for urgent transfer for brain bleed to accepting neurosurgeon Dr. Serg Rodney. Report consisted of patients Situation, Background, Assessment and   Recommendations(SBAR). Information from the following report(s) SBAR, Kardex, ED Summary, MAR, Recent Results and Cardiac Rhythm Normal Sinus Rhythm  was reviewed with the receiving nurse. Lines:   Peripheral IV 03/16/19 Right Antecubital (Active)   Site Assessment Clean, dry, & intact 3/16/2019  4:29 PM   Phlebitis Assessment 0 3/16/2019  4:29 PM   Infiltration Assessment 0 3/16/2019  4:29 PM   Dressing Status Clean, dry, & intact 3/16/2019  4:29 PM   Hub Color/Line Status Pink 3/16/2019  4:29 PM   Alcohol Cap Used Yes 3/16/2019  4:29 PM        Opportunity for questions and clarification was provided. Patient transported with:   Monitor     Patient transported with [facility].

## 2019-03-16 NOTE — ED NOTES
Transfer Center informed that patient is transferring to [facility] ICU - 19. Confirmed that Telemetry Monitoring is required for transport. States that [facility] should arrive within the hour.

## 2019-03-17 PROCEDURE — 74011000250 HC RX REV CODE- 250: Performed by: INTERNAL MEDICINE

## 2019-03-17 PROCEDURE — 74011250636 HC RX REV CODE- 250/636: Performed by: INTERNAL MEDICINE

## 2019-03-17 PROCEDURE — 65610000006 HC RM INTENSIVE CARE

## 2019-03-17 PROCEDURE — 74011250636 HC RX REV CODE- 250/636: Performed by: SPECIALIST

## 2019-03-17 RX ORDER — HYDRALAZINE HYDROCHLORIDE 20 MG/ML
10 INJECTION INTRAMUSCULAR; INTRAVENOUS
Status: DISCONTINUED | OUTPATIENT
Start: 2019-03-17 | End: 2019-03-18

## 2019-03-17 RX ORDER — IPRATROPIUM BROMIDE AND ALBUTEROL SULFATE 2.5; .5 MG/3ML; MG/3ML
3 SOLUTION RESPIRATORY (INHALATION)
Status: DISCONTINUED | OUTPATIENT
Start: 2019-03-17 | End: 2019-03-21 | Stop reason: HOSPADM

## 2019-03-17 RX ORDER — LABETALOL HCL 20 MG/4 ML
10 SYRINGE (ML) INTRAVENOUS
Status: DISCONTINUED | OUTPATIENT
Start: 2019-03-17 | End: 2019-03-18

## 2019-03-17 RX ADMIN — Medication 10 ML: at 13:16

## 2019-03-17 RX ADMIN — SODIUM CHLORIDE 100 ML/HR: 900 INJECTION, SOLUTION INTRAVENOUS at 08:46

## 2019-03-17 RX ADMIN — FAMOTIDINE 20 MG: 10 INJECTION, SOLUTION INTRAVENOUS at 11:54

## 2019-03-17 RX ADMIN — SODIUM CHLORIDE 100 ML/HR: 900 INJECTION, SOLUTION INTRAVENOUS at 17:50

## 2019-03-17 RX ADMIN — HYDRALAZINE HYDROCHLORIDE 10 MG: 20 INJECTION INTRAMUSCULAR; INTRAVENOUS at 13:17

## 2019-03-17 RX ADMIN — HYDRALAZINE HYDROCHLORIDE 10 MG: 20 INJECTION INTRAMUSCULAR; INTRAVENOUS at 20:16

## 2019-03-17 RX ADMIN — Medication 10 ML: at 06:00

## 2019-03-17 RX ADMIN — HYDRALAZINE HYDROCHLORIDE 10 MG: 20 INJECTION INTRAMUSCULAR; INTRAVENOUS at 16:26

## 2019-03-17 NOTE — PROGRESS NOTES
2955- Bedside report received from Tricia Valladares RN, using [employer]. Pt resting at this time, but easily arousable. Pt opens eyes to voice. Will vocalize name after much encouragement, but also has incomprehensible speech. Pt restless and agitated upon awakening. Will not follow commands. 1317- Hydralazine 10mg given IV per PRN orders for SBP > 140 ((/70  (93))    1626- Hydralazine 10mg given IV per PRN orders for SBP > 140  (/54 (79))    1930-Bedside report given to Tricia Valladares RN, using [employer]. Pt resting at this time. Pt intermittently oriented to self, but mostly refuses to answer questions. Pt with decreased command following x 1 this evening, but otherwise SY spontaneously.

## 2019-03-17 NOTE — PROGRESS NOTES
Neurosurgery Progress Note     Date: 3/17/2019  Admit Date: 3/16/2019     LOS: 1 day     Chief Complaint:  confusion    Interval History: restrained overnight for combativeness      Subjective:     Review of systems unobtainable due to: altered mental status          Objective:     Patient Vitals for the past 8 hrs:   BP Temp Pulse Resp SpO2 Weight   19 1000 153/59  74 22 97 %    19 0900 101/72  76 24 98 %    19 0800 151/52 98.1 °F (36.7 °C) 70 26 97 %    19 0615     97 % 46.1 kg (101 lb 10.1 oz)   19 0600 147/49  74 (!) 31 98 %    19 0500 145/49  71 (!) 33 98 %    19 0400 140/48 98.2 °F (36.8 °C) 75 28 98 %        Temp (24hrs), Av.1 °F (36.7 °C), Min:97.9 °F (36.6 °C), Max:98.3 °F (36.8 °C)       0701 -  1900  In: 400 [I.V.:400]  Out: 95 [Urine:95]    Physical Exam:    General : NAD  Will tell me her name, but no other orientation questions  Will move all 4 extremities. Won't open eyes for me, forcibly holding them closed      Rest of exam unobtainable due to neurologic status  F      Labs:    Recent Results (from the past 24 hour(s))   CBC WITH AUTOMATED DIFF    Collection Time: 19  4:29 PM   Result Value Ref Range    WBC 8.5 3.6 - 11.0 K/uL    RBC 4.22 3.80 - 5.20 M/uL    HGB 14.4 11.5 - 16.0 g/dL    HCT 43.3 35.0 - 47.0 %    .6 (H) 80.0 - 99.0 FL    MCH 34.1 (H) 26.0 - 34.0 PG    MCHC 33.3 30.0 - 36.5 g/dL    RDW 12.1 11.5 - 14.5 %    PLATELET 973 183 - 520 K/uL    MPV 9.9 8.9 - 12.9 FL    NRBC 0.0 0  WBC    ABSOLUTE NRBC 0.00 0.00 - 0.01 K/uL    NEUTROPHILS 89 (H) 32 - 75 %    LYMPHOCYTES 7 (L) 12 - 49 %    MONOCYTES 4 (L) 5 - 13 %    EOSINOPHILS 0 0 - 7 %    BASOPHILS 0 0 - 1 %    IMMATURE GRANULOCYTES 0 0.0 - 0.5 %    ABS. NEUTROPHILS 7.6 1.8 - 8.0 K/UL    ABS. LYMPHOCYTES 0.6 (L) 0.8 - 3.5 K/UL    ABS. MONOCYTES 0.3 0.0 - 1.0 K/UL    ABS. EOSINOPHILS 0.0 0.0 - 0.4 K/UL    ABS. BASOPHILS 0.0 0.0 - 0.1 K/UL    ABS. IMM. GRANS. 0.0 0.00 - 0.04 K/UL    DF SMEAR SCANNED      RBC COMMENTS NORMOCYTIC, NORMOCHROMIC     METABOLIC PANEL, COMPREHENSIVE    Collection Time: 03/16/19  4:29 PM   Result Value Ref Range    Sodium 144 136 - 145 mmol/L    Potassium 3.3 (L) 3.5 - 5.1 mmol/L    Chloride 105 97 - 108 mmol/L    CO2 33 (H) 21 - 32 mmol/L    Anion gap 6 5 - 15 mmol/L    Glucose 131 (H) 65 - 100 mg/dL    BUN 15 6 - 20 MG/DL    Creatinine 0.88 0.55 - 1.02 MG/DL    BUN/Creatinine ratio 17 12 - 20      GFR est AA >60 >60 ml/min/1.73m2    GFR est non-AA >60 >60 ml/min/1.73m2    Calcium 9.8 8.5 - 10.1 MG/DL    Bilirubin, total 0.9 0.2 - 1.0 MG/DL    ALT (SGPT) 23 12 - 78 U/L    AST (SGOT) 21 15 - 37 U/L    Alk. phosphatase 87 45 - 117 U/L    Protein, total 6.7 6.4 - 8.2 g/dL    Albumin 3.4 (L) 3.5 - 5.0 g/dL    Globulin 3.3 2.0 - 4.0 g/dL    A-G Ratio 1.0 (L) 1.1 - 2.2     PTT    Collection Time: 03/16/19  4:59 PM   Result Value Ref Range    aPTT 24.3 22.1 - 32.0 sec    aPTT, therapeutic range     58.0 - 77.0 SECS   PROTHROMBIN TIME + INR    Collection Time: 03/16/19  4:59 PM   Result Value Ref Range    INR 1.1 0.9 - 1.1      Prothrombin time 11.4 (H) 9.0 - 11.1 sec   URINALYSIS W/ REFLEX CULTURE    Collection Time: 03/16/19 10:33 PM   Result Value Ref Range    Color YELLOW/STRAW      Appearance CLEAR CLEAR      Specific gravity 1.016 1.003 - 1.030      pH (UA) 7.0 5.0 - 8.0      Protein TRACE (A) NEG mg/dL    Glucose NEGATIVE  NEG mg/dL    Ketone NEGATIVE  NEG mg/dL    Bilirubin NEGATIVE  NEG      Blood NEGATIVE  NEG      Urobilinogen 0.2 0.2 - 1.0 EU/dL    Nitrites NEGATIVE  NEG      Leukocyte Esterase NEGATIVE  NEG      WBC 0-4 0 - 4 /hpf    RBC 0-5 0 - 5 /hpf    Epithelial cells FEW FEW /lpf    Bacteria NEGATIVE  NEG /hpf    UA:UC IF INDICATED CULTURE NOT INDICATED BY UA RESULT CNI      Hyaline cast 2-5 0 - 5 /lpf         Assessment:     Active Problems:    Stroke ([facility] [address] (3/16/2019)        Plan:      Will need MRI brain, with and without  Will likely need sedation as she is not cooperative. Therefore will have to be done tomorrow    Continue to watch closely    Judy Malone MD  35 minutes were spent with the patient, over half of which was face to face  counseling and coordination of care, discussing with nursing, obtaining history, examining patient and discussing treatment plans.

## 2019-03-17 NOTE — CONSULTS
Neurosurgery  Pt seen and examined, full consult to follow. Baseline dementia, accelerated over last month and more last week. More confusion, combative, not talking    CT shows hemorrhage intraventricular, ? Lesion right posterior temporal     May be mass, or stroke. Will need MRI brain, which will be a challenge given her mental status  Suspect will need sedation to do it, which may have to wait until Monday    Repeat head CT now.     Watch in ICU

## 2019-03-17 NOTE — H&P
Hospitalist Service  Progress Note    Daily Progress Note: 3/17/2019    Assessment/Plan:   ASSESSMENT:  1. encephalopathy  probably related to the acute intracranial bleed  Pending MRI  ADMITTED TO [facility]  2. Intracranial hemorrhage of unclear etiology. Possible hemorrhagic stroke. There is no report of significant head trauma. 3.  Suspected urinary tract infection. 4.  Underlying dementia with recent decline.   5.  Chronic obstructive pulmonary disease/asthma                 Subjective:   encephalopatic    Review of Systems:       Objective:   Physical examination  Visit Vitals  /50   Pulse 76   Temp 97.9 °F (36.6 °C)   Resp (!) 33   Wt 46.1 kg (101 lb 10.1 oz)   SpO2 97%   BMI 18.89 kg/m²      Temp (24hrs), Av.3 °F (36.8 °C), Min:97.9 °F (36.6 °C), Max:99.4 °F (37.4 °C)     O2 Flow Rate (L/min): 2 l/min   O2 Device: Nasal cannula  Patient Vitals for the past 24 hrs:   Temp Pulse Resp BP SpO2   19 1900  76 (!) 33 138/50 97 %   19 1800  80 30 133/45 97 %   19 1700  77 (!) 36 139/43 96 %   19 1630  72 30 140/50 96 %   19 1600 97.9 °F (36.6 °C) 68 (!) 32 145/54 96 %   19 1500  63 25 135/55 95 %   19 1400  79 27 141/47 95 %   19 1300  66 29 150/70 98 %   19 1200 99.4 °F (37.4 °C) 66 16 138/55 100 %   19 1100  72 24 135/60 95 %   19 1000  74 22 153/59 97 %   19 0900  76 24 101/72 98 %   19 0800 98.1 °F (36.7 °C) 70 26 151/52 97 %   19 0615     97 %   19 0600  74 (!) 31 147/49 98 %   19 0500  71 (!) 33 145/49 98 %   19 0400 98.2 °F (36.8 °C) 75 28 140/48 98 %   19 0300  75 (!) 32 139/48 97 %   19 0200  73 25 141/47 97 %   19 0100  72 (!) 33 138/47 96 %   19 0000 98.3 °F (36.8 °C) 76 (!) 31 136/48 91 %   19 2300  73 22 131/49 92 %   19 2100  79 24 132/49 93 %   19 2000 97.9 °F (36.6 °C) 72 24 142/54 94 % Intake/Output Summary (Last 24 hours) at 3/17/2019 1945  Last data filed at 3/17/2019 1900  Gross per 24 hour   Intake 2053.33 ml   Output 1170 ml   Net 883.33 ml     Last shift:    No intake/output data recorded. Last 3 shifts:    03/16 0701 - 03/17 1900  In: 2053.3 [I.V.:2053.3]  Out: 7984 [Urine:1170]    General:   Encephalopatic, no acute distress   Head:   Atraumatic   Eyes:   Conjunctivae clear   ENT:  Oral mucosa normal   Neck:  Supple, trachea midline, no adenopathy   No JVD   Back:    No CVA tenderness    Chest wall:    No tenderness or deformities    Lungs:   Clear to auscultation bilaterally    Heart:   Regular rhythm, no murmur   Abdomen:    Soft, non-tender   No masses or organomegaly    Extremities:  No edema or DVT signs   Pulses:  Symmetric all extremities   Skin:  Warm and dry    No rashes or lesions   Neurologic:   No focal deficits   Psychiatric:          Data Review:       Recent Labs     03/16/19  1629   WBC 8.5   HGB 14.4   HCT 43.3        Recent Labs     03/16/19  1659 03/16/19  1629   NA  --  144   K  --  3.3*   CL  --  105   CO2  --  33*   GLU  --  131*   BUN  --  15   CREA  --  0.88   CA  --  9.8   ALB  --  3.4*   SGOT  --  21   ALT  --  23   INR 1.1  --      No results for input(s): PH, PCO2, PO2, HCO3, FIO2 in the last 72 hours.     Lab Results   Component Value Date/Time    Glucose 131 (H) 03/16/2019 04:29 PM        All Micro Results     Procedure Component Value Units Date/Time    CULTURE, URINE [[id number]]     Order Status:  Sent Specimen:  Edge Specimen         No results found for: SDES  No results found for: CULT  Medications reviewed  Current Facility-Administered Medications   Medication Dose Route Frequency    albuterol-ipratropium (DUO-NEB) 2.5 MG-0.5 MG/3 ML  3 mL Nebulization Q6H PRN    hydrALAZINE (APRESOLINE) 20 mg/mL injection 10 mg  10 mg IntraVENous Q2H PRN    labetalol (NORMODYNE;TRANDATE) 20 mg/4 mL (5 mg/mL) injection 10 mg  10 mg IntraVENous Q2H PRN  [START ON 3/18/2019] famotidine (PF) (PEPCID) 20 mg in sodium chloride 0.9% 10 mL injection  20 mg IntraVENous Q24H    sodium chloride (NS) flush 5-40 mL  5-40 mL IntraVENous Q8H    sodium chloride (NS) flush 5-40 mL  5-40 mL IntraVENous PRN    0.9% sodium chloride infusion  100 mL/hr IntraVENous CONTINUOUS         Signed:   Alda Gitelman, MD   Internist / Hospitalist

## 2019-03-17 NOTE — PROGRESS NOTES
2000- Dr. Vickie Guzmán (Attending MD) informed of patient presence. RN unable to touch, communicate with, or assess patient at this time d/t agitation and combative behavior in response to touch/vocalization. No orders at this time. Awaiting hospitalist and Dr. Gordon High. 2115-DrSiegfried Patient at bedside. Patient stating \"please stop\", will not vocalize orientation or allow pupil assessment. SCD's placed on patient. Per MD, RN is to assess as much as possible in this state. 2130- Dr. Gordon High at bedside. Patient given 1 mg Ativan IV in order to complete a repeat head CT d/t agitation. Head CT repeated. 2230- BUE restraints placed. Edge placed. Urinalysis and culture sent to lab. Pupils perrl at 1 mm. Moves all extremities. No command following.  \    2350- NC at 3 lpm placed d/t o2 saturation 90 percent at rest.

## 2019-03-17 NOTE — PROGRESS NOTES
Problem: Pressure Injury - Risk of  Goal: *Prevention of pressure injury  Document Mervin Scale and appropriate interventions in the flowsheet. Outcome: Progressing Towards Goal  Pressure Injury Interventions: Activity Interventions: Assess need for specialty bed, Pressure redistribution bed/mattress(bed type)    Mobility Interventions: Pressure redistribution bed/mattress (bed type), Turn and reposition approx. every two hours(pillow and wedges)    Nutrition Interventions: Document food/fluid/supplement intake    Friction and Shear Interventions: Apply protective barrier, creams and emollients, Lift sheet, Minimize layers               Problem: Falls - Risk of  Goal: *Absence of Falls  Document Scott Fall Risk and appropriate interventions in the flowsheet.   Outcome: Progressing Towards Goal  Fall Risk Interventions:       Mentation Interventions: Adequate sleep, hydration, pain control, Door open when patient unattended    Medication Interventions: Evaluate medications/consider consulting pharmacy, Teach patient to arise slowly, Patient to call before getting OOB    Elimination Interventions: Call light in reach, Toileting schedule/hourly rounds    History of Falls Interventions: Door open when patient unattended, Evaluate medications/consider consulting pharmacy

## 2019-03-17 NOTE — PROGRESS NOTES
PCCM    Chart reviewed  ICH/ ?  Mass  Awaiting MRI  COPD  dementia        On SCDs  Added Pedcid/ prn nebs  Neuro checks per NSGY    We are available as needed acute ICU issues

## 2019-03-17 NOTE — PROGRESS NOTES
Problem: Pressure Injury - Risk of  Goal: *Prevention of pressure injury  Document Mervin Scale and appropriate interventions in the flowsheet. Outcome: Progressing Towards Goal  Pressure Injury Interventions: Activity Interventions: Pressure redistribution bed/mattress(bed type)    Mobility Interventions: Assess need for specialty bed, HOB 30 degrees or less, Pressure redistribution bed/mattress (bed type), Turn and reposition approx. every two hours(pillow and wedges)    Nutrition Interventions: Discuss nutritional consult with provider    Friction and Shear Interventions: Apply protective barrier, creams and emollients, Foam dressings/transparent film/skin sealants, HOB 30 degrees or less, Lift sheet, Lift team/patient mobility team               Problem: Falls - Risk of  Goal: *Absence of Falls  Document Scott Fall Risk and appropriate interventions in the flowsheet.   Outcome: Progressing Towards Goal  Fall Risk Interventions:       Mentation Interventions: Adequate sleep, hydration, pain control, Bed/chair exit alarm, Evaluate medications/consider consulting pharmacy, Door open when patient unattended, More frequent rounding, Reorient patient, Toileting rounds, Update white board, Room close to nurse's station    Medication Interventions: Bed/chair exit alarm, Evaluate medications/consider consulting pharmacy    Elimination Interventions: Bed/chair exit alarm, Call light in reach, Patient to call for help with toileting needs, Toileting schedule/hourly rounds    History of Falls Interventions: Door open when patient unattended, Bed/chair exit alarm

## 2019-03-17 NOTE — H&P
[address]  HISTORY AND PHYSICAL    Name:  Elizabeth Aponte  MR#:  [id number]  :  1940  ACCOUNT #:  [de-identified]  ADMIT DATE:  2019      CHIEF COMPLAINT:  Altered mental status. HISTORY OF PRESENT ILLNESS:  The patient is a 26-year-old lady with a history of COPD, who was brought to the emergency department by ambulance for altered mental status. The patient is unable to provide history due to her altered mental status. History was obtained from records and staff. I was unable to reach the family so far and they have just left for the night. She reportedly was recently diagnosed with a UTI and she has been more lethargic and fatigue for the past couple of days. She has been increasingly confused and restless. There is also report of diarrhea and feeling unwell. It is not clear if the patient vomited. There is also report of leg cramping. No report of falls or head trauma. The patient is now in the intensive care unit and resting with her eyes closed. She does respond to voice but does not really follow commands. She does not cooperative with the exam and is somewhat belligerent on attempts to wake her up. PAST MEDICAL HISTORY:  1. COPD. 2.  Asthma. 3.  Question dementia. MEDICATION LIST:  Includes,  1. Albuterol inhaler. 2.  Tessalon Perles. 3.  Vitamin D.  4.  Magnesium. 5.  Ambien p.r.n. ALLERGIES:  INCLUDE PENICILLIN. SOCIAL HISTORY:  The patient lives with family. Baseline functional status is not entirely clear. She is a frail elderly lady. FAMILY HISTORY:  Includes heart disease. REVIEW OF SYSTEMS:  Unobtainable due to the patient's factors. PHYSICAL EXAMINATION:  GENERAL:  The patient is an elderly lady in no respiratory distress. VITAL SIGNS:  Temperature 97.9, pulse 72, respirations 24, blood pressure 142/64, oxygen saturation 93% on room air.   HEENT:  Pupils appear round and normal sized though I was unable to formally test pupillary size and reactivity. There is no head trauma. NECK:  Supple. LUNGS:  Clear to auscultation bilaterally. HEART:  Rhythm is regular. ABDOMEN:  Soft. EXTREMITIES:  Lower extremity examination shows no edema or rashes. NEUROLOGIC:  The patient is able to move her upper and lower extremities symmetrically and is uncooperative with formal neurologic exam.    LABORATORY DATA:  White blood cell count 8.5, hemoglobin 14.4, platelets 992. Sodium 144, potassium 3.3, CO2 of 33, glucose 131, BUN 15, creatinine 0.8. LFTS  normal.    Head CT scan shows tiny focus of subarachnoid hemorrhage with intraventricular hemorrhage as well. There is vasogenic edema in the posterior right temporooccipital region with associated minimal hemorrhage without midline shift. The third and fourth ventricles appear patent. EKG not available. Chest x-ray shows no process. ASSESSMENT:  1. Altered mental status probably related to the acute intracranial bleed. Component of encephalopathy from urinary tract infection/metabolic is not clear. 2.  Intracranial hemorrhage of unclear etiology. Possible hemorrhagic stroke. There is no report of significant head trauma. 3.  Suspected urinary tract infection. 4.  Underlying dementia with recent decline. 5.  Chronic obstructive pulmonary disease/asthma. PLAN:  The patient is admitted to the intensive care unit for further management. Will be seen on consultation by Neurosurgery. She will be monitored closely. We will keep her n.p.o., on intravenous fluids. We will use soft wrist restraints, try to insert a Edge catheter, and send urinalysis and urine culture. Repeat head CT per neurosurgery. Code status is full code at this point. Frther evaluation as indicated. She may be able to return to home if she makes a good recovery though she will probably need to go to a rehab facility.         Marily Cardenas MD      [id number]  D:  03/16/2019 21:39  T:  03/17/2019 1:39  JOB #:  [id number]

## 2019-03-17 NOTE — CONSULTS
[address]    Name:  Minal Calvo  MR#:  [id number]  :  1940  ACCOUNT #:  [de-identified]  DATE OF SERVICE:  2019      INPATIENT CONSULTATION    REASON FOR CONSULTATION:  Intracerebral hemorrhage. HISTORY OF PRESENT ILLNESS:  The patient is a 26-year-old female. The history is from the  as she is not able to give any history. He relates a 10-year history of behavioral changes, started with hoarding, and has been worked up more recently for dementia. She got sick in February as he did, and they both were pretty lethargic and fatigued the whole time. The spouse bounced back, but then after this, she really never did and continued to be tied in the recliner over the last month and more so over the last week and then in the last day. She was more confused last week but even more confused now. She has been not really eating or drinking as well and has been more difficult to feed and has been lying in bed a lot. He brought her in because of the above findings. She does not have any history of cancer. She does have some headache. PAST MEDICAL HISTORY:  1. COPD. 2.  Asthma. 3.  L1 compression fracture. SOCIAL HISTORY:  Positive for tobacco use, current smoker. Negative for alcohol. FAMILY HISTORY:  Heart disease in brother and father. ALLERGIES:  LATEX, PENICILLIN. REVIEW OF SYSTEMS:  Unable to obtain secondary to her status. PHYSICAL EXAMINATION:  VITAL SIGNS:  Her temperature is 97.9, pulse is 72, blood pressure is 142/54, respiration is 24, saturation is 94%. GENERAL:  She is an elderly female sitting on her right side. 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 She has been fairly combative while she has been here, though I was able to calm her down, get her open her eyes. HEENT:  Her head is otherwise normocephalic, atraumatic. Pupils are small and reactive.   Extraocular movements are intact. NEUROLOGIC:  She is moving all four but not following any commands. Sensation appears intact to withdrawal.  The rest of her neurologic exam is unable to obtain secondary to her neurologic status. DATA:  [facility] scan done at [facility] shows intraventricular hemorrhage bilaterally, left greater than right. There is some hemorrhage with some vasogenic edema on the right in the posterior temporal lobe. There is no hydrocephalus, mass effect, or shift. IMPRESSION:  Intraventricular hemorrhage, questionable mass right posterior temporal.    RECOMMENDATIONS:  This may represent a subacute stroke more than a mass. She needs an MRI to sort this out. Certainly, she has a strong smoking history as well. I think, given her current combative state, it is going to be difficult to do an MRI unless we do sedation which probably will not happen on a Sunday. I am going to repeat her head CT right now as it has been about six hours. I have spoken with her  on the phone as well, and I told him these are our plans. Further recommendations to follow.       Francis Tong MD      [id number]  D:  03/16/2019 22:00  T:  03/17/2019 1:43  JOB #:  [id number]

## 2019-03-18 PROBLEM — I61.9 ICH (INTRACEREBRAL HEMORRHAGE) (HCC): Status: ACTIVE | Noted: 2019-03-16

## 2019-03-18 LAB
ALBUMIN SERPL-MCNC: 2.8 G/DL (ref 3.5–5)
ALBUMIN/GLOB SERPL: 0.9 {RATIO} (ref 1.1–2.2)
ALP SERPL-CCNC: 65 U/L (ref 45–117)
ALT SERPL-CCNC: 21 U/L (ref 12–78)
ANION GAP SERPL CALC-SCNC: 5 MMOL/L (ref 5–15)
AST SERPL-CCNC: 31 U/L (ref 15–37)
BASOPHILS # BLD: 0.1 K/UL (ref 0–0.1)
BASOPHILS NFR BLD: 1 % (ref 0–1)
BILIRUB SERPL-MCNC: 1 MG/DL (ref 0.2–1)
BUN SERPL-MCNC: 17 MG/DL (ref 6–20)
BUN/CREAT SERPL: 23 (ref 12–20)
CALCIUM SERPL-MCNC: 8.9 MG/DL (ref 8.5–10.1)
CHLORIDE SERPL-SCNC: 117 MMOL/L (ref 97–108)
CO2 SERPL-SCNC: 28 MMOL/L (ref 21–32)
CREAT SERPL-MCNC: 0.75 MG/DL (ref 0.55–1.02)
DIFFERENTIAL METHOD BLD: ABNORMAL
EOSINOPHIL # BLD: 0 K/UL (ref 0–0.4)
EOSINOPHIL NFR BLD: 0 % (ref 0–7)
ERYTHROCYTE [DISTWIDTH] IN BLOOD BY AUTOMATED COUNT: 13.2 % (ref 11.5–14.5)
GLOBULIN SER CALC-MCNC: 3.1 G/DL (ref 2–4)
GLUCOSE SERPL-MCNC: 115 MG/DL (ref 65–100)
HCT VFR BLD AUTO: 37.2 % (ref 35–47)
HGB BLD-MCNC: 11.7 G/DL (ref 11.5–16)
IMM GRANULOCYTES # BLD AUTO: 0 K/UL (ref 0–0.04)
IMM GRANULOCYTES NFR BLD AUTO: 0 % (ref 0–0.5)
LYMPHOCYTES # BLD: 0.8 K/UL (ref 0.8–3.5)
LYMPHOCYTES NFR BLD: 9 % (ref 12–49)
MCH RBC QN AUTO: 33.7 PG (ref 26–34)
MCHC RBC AUTO-ENTMCNC: 31.5 G/DL (ref 30–36.5)
MCV RBC AUTO: 107.2 FL (ref 80–99)
MONOCYTES # BLD: 1.1 K/UL (ref 0–1)
MONOCYTES NFR BLD: 12 % (ref 5–13)
NEUTS SEG # BLD: 7.2 K/UL (ref 1.8–8)
NEUTS SEG NFR BLD: 78 % (ref 32–75)
NRBC # BLD: 0 K/UL (ref 0–0.01)
NRBC BLD-RTO: 0 PER 100 WBC
PLATELET # BLD AUTO: 206 K/UL (ref 150–400)
PMV BLD AUTO: 10.6 FL (ref 8.9–12.9)
POTASSIUM SERPL-SCNC: 3.7 MMOL/L (ref 3.5–5.1)
PROT SERPL-MCNC: 5.9 G/DL (ref 6.4–8.2)
RBC # BLD AUTO: 3.47 M/UL (ref 3.8–5.2)
RBC MORPH BLD: ABNORMAL
SODIUM SERPL-SCNC: 150 MMOL/L (ref 136–145)
WBC # BLD AUTO: 9.2 K/UL (ref 3.6–11)

## 2019-03-18 PROCEDURE — 65660000000 HC RM CCU STEPDOWN

## 2019-03-18 PROCEDURE — 85025 COMPLETE CBC W/AUTO DIFF WBC: CPT

## 2019-03-18 PROCEDURE — 97165 OT EVAL LOW COMPLEX 30 MIN: CPT

## 2019-03-18 PROCEDURE — 74011250637 HC RX REV CODE- 250/637: Performed by: NURSE PRACTITIONER

## 2019-03-18 PROCEDURE — 74011000250 HC RX REV CODE- 250: Performed by: INTERNAL MEDICINE

## 2019-03-18 PROCEDURE — 97161 PT EVAL LOW COMPLEX 20 MIN: CPT

## 2019-03-18 PROCEDURE — 74011250636 HC RX REV CODE- 250/636: Performed by: INTERNAL MEDICINE

## 2019-03-18 PROCEDURE — 97530 THERAPEUTIC ACTIVITIES: CPT

## 2019-03-18 PROCEDURE — 77010033678 HC OXYGEN DAILY

## 2019-03-18 PROCEDURE — 80053 COMPREHEN METABOLIC PANEL: CPT

## 2019-03-18 PROCEDURE — 74011250636 HC RX REV CODE- 250/636: Performed by: SPECIALIST

## 2019-03-18 PROCEDURE — 36415 COLL VENOUS BLD VENIPUNCTURE: CPT

## 2019-03-18 RX ORDER — HYDRALAZINE HYDROCHLORIDE 20 MG/ML
10 INJECTION INTRAMUSCULAR; INTRAVENOUS
Status: DISCONTINUED | OUTPATIENT
Start: 2019-03-18 | End: 2019-03-21 | Stop reason: HOSPADM

## 2019-03-18 RX ORDER — LORAZEPAM 2 MG/ML
0.5 INJECTION INTRAMUSCULAR
Status: ACTIVE | OUTPATIENT
Start: 2019-03-18 | End: 2019-03-19

## 2019-03-18 RX ORDER — LORAZEPAM 2 MG/ML
1 INJECTION INTRAMUSCULAR
Status: DISCONTINUED | OUTPATIENT
Start: 2019-03-18 | End: 2019-03-18

## 2019-03-18 RX ORDER — LABETALOL HCL 20 MG/4 ML
10 SYRINGE (ML) INTRAVENOUS
Status: DISCONTINUED | OUTPATIENT
Start: 2019-03-18 | End: 2019-03-21 | Stop reason: HOSPADM

## 2019-03-18 RX ORDER — LEVETIRACETAM 500 MG/1
500 TABLET ORAL 2 TIMES DAILY
Status: DISCONTINUED | OUTPATIENT
Start: 2019-03-18 | End: 2019-03-21 | Stop reason: HOSPADM

## 2019-03-18 RX ORDER — HYDROCODONE BITARTRATE AND ACETAMINOPHEN 5; 325 MG/1; MG/1
1 TABLET ORAL
Status: DISCONTINUED | OUTPATIENT
Start: 2019-03-18 | End: 2019-03-21 | Stop reason: HOSPADM

## 2019-03-18 RX ORDER — FAMOTIDINE 20 MG/1
20 TABLET, FILM COATED ORAL 2 TIMES DAILY
Status: DISCONTINUED | OUTPATIENT
Start: 2019-03-18 | End: 2019-03-18

## 2019-03-18 RX ORDER — AMLODIPINE BESYLATE 5 MG/1
5 TABLET ORAL DAILY
Status: DISCONTINUED | OUTPATIENT
Start: 2019-03-18 | End: 2019-03-21 | Stop reason: HOSPADM

## 2019-03-18 RX ORDER — ACETAMINOPHEN 325 MG/1
650 TABLET ORAL
Status: DISCONTINUED | OUTPATIENT
Start: 2019-03-18 | End: 2019-03-21 | Stop reason: HOSPADM

## 2019-03-18 RX ORDER — AMLODIPINE BESYLATE 5 MG/1
5 TABLET ORAL DAILY
Status: DISCONTINUED | OUTPATIENT
Start: 2019-03-19 | End: 2019-03-18

## 2019-03-18 RX ORDER — FAMOTIDINE 20 MG/1
20 TABLET, FILM COATED ORAL DAILY
Status: DISCONTINUED | OUTPATIENT
Start: 2019-03-19 | End: 2019-03-21 | Stop reason: HOSPADM

## 2019-03-18 RX ADMIN — LEVETIRACETAM 500 MG: 500 TABLET ORAL at 18:18

## 2019-03-18 RX ADMIN — LABETALOL 20 MG/4 ML (5 MG/ML) INTRAVENOUS SYRINGE 10 MG: at 11:58

## 2019-03-18 RX ADMIN — Medication 10 ML: at 22:00

## 2019-03-18 RX ADMIN — Medication 10 ML: at 06:00

## 2019-03-18 RX ADMIN — Medication 10 ML: at 00:25

## 2019-03-18 RX ADMIN — FAMOTIDINE 20 MG: 10 INJECTION, SOLUTION INTRAVENOUS at 11:57

## 2019-03-18 RX ADMIN — LABETALOL 20 MG/4 ML (5 MG/ML) INTRAVENOUS SYRINGE 10 MG: at 00:25

## 2019-03-18 RX ADMIN — Medication 10 ML: at 15:42

## 2019-03-18 RX ADMIN — LABETALOL 20 MG/4 ML (5 MG/ML) INTRAVENOUS SYRINGE 10 MG: at 07:19

## 2019-03-18 RX ADMIN — AMLODIPINE BESYLATE 5 MG: 5 TABLET ORAL at 13:50

## 2019-03-18 RX ADMIN — HYDRALAZINE HYDROCHLORIDE 10 MG: 20 INJECTION INTRAMUSCULAR; INTRAVENOUS at 09:38

## 2019-03-18 NOTE — PROGRESS NOTES
Problem: Mobility Impaired (Adult and Pediatric)  Goal: *Acute Goals and Plan of Care (Insert Text)  Physical Therapy Goals  Initiated 3/18/2019  1. Patient will move from supine to sit and sit to supine  in bed with supervision/set-up within 7 day(s). 2.  Patient will transfer from bed to chair and chair to bed with supervision/set-up using the least restrictive device within 7 day(s). 3.  Patient will perform sit to stand with supervision/set-up within 7 day(s). 4.  Patient will ambulate with minimal assistance/contact guard assist for 100 feet with the least restrictive device within 7 day(s). 5.  Patient will ascend/descend 12 stairs with single handrail(s) with minimal assist  within 7 day(s). 6.  Patient will improve Mensah Balance score by 7 points within 7 days. physical Therapy EVALUATION  Patient: Jillian Teague (53 y.o. female)  Date: 3/18/2019  Primary Diagnosis: Stroke [facility]) [I63.9]       Precautions:   Fall    ASSESSMENT :  Based on the objective data described below, the patient presents with decreased functional mobility, impaired balance and gait, confusion s/p admission for Right temporal ICH with IVH. Pt received supine in bed and agreeable to therapy. Pt oriented to self and location only. Pt reported prior to admission, she was ambulatory without AD, lives with spouse in a 2 story home. Pt reported history of falls, but did not provide any further details of the falls. Pt on 2L of oxygen currently, but was not on home O2. No focal weakness noted, delayed finger to nose test, but WNL. Pt completed supine<>sit with standby assist and additional time. Pt performed sit<>stand x 2 trials with min A and side stepped along the bedside with generalized instability, but no overt LOB. Pt reported feeling woozy during standing, but BP stable. Pt returned to supine position with all needs met.  Anticipate pt will benefit from inpatient rehab upon discharge given weakness and reports of falls pending progress and hospital course (Brain MRI pending still to r/o tumor). Patient will benefit from skilled intervention to address the above impairments. Patients rehabilitation potential is considered to be Good  Factors which may influence rehabilitation potential include:   []         None noted  []         Mental ability/status  [x]         Medical condition  []         Home/family situation and support systems  [x]         Safety awareness  []         Pain tolerance/management  []         Other:      PLAN :  Recommendations and Planned Interventions:  [x]           Bed Mobility Training             [x]    Neuromuscular Re-Education  [x]           Transfer Training                   []    Orthotic/Prosthetic Training  [x]           Gait Training                         []    Modalities  [x]           Therapeutic Exercises           []    Edema Management/Control  [x]           Therapeutic Activities            [x]    Patient and Family Training/Education  []           Other (comment):    Frequency/Duration: Patient will be followed by physical therapy  5 times a week to address goals. Discharge Recommendations: Inpatient Rehab pending progress  Further Equipment Recommendations for Discharge: tbd     SUBJECTIVE:   Patient stated I would like to have the MRI done now. Can you tell my nurse I am ready? Alta Stephen    OBJECTIVE DATA SUMMARY:   HISTORY:    Past Medical History:   Diagnosis Date    Asthma     Chronic obstructive pulmonary disease ([facility]     COPD with asthma ([facility]     Headache     Musculoskeletal disorder    No past surgical history on file. Prior Level of Function/Home Situation: ambulatory without AD, lives with spouse in a 2 story home, reports history of falls.  Pt also reported having a decline in appetite and losing weight unintentionally  Personal factors and/or comorbidities impacting plan of care:     Home Situation  Home Environment: Private residence  One/Two Story Residence: Two story  Living Alone: No  Support Systems: Spouse/Significant Other/Partner  Current DME Used/Available at Home: None    EXAMINATION/PRESENTATION/DECISION MAKING:   Critical Behavior:  Neurologic State: Alert  Orientation Level: Oriented to person, Oriented to place, Oriented to situation  Cognition: Follows commands     Hearing:     Skin:    Edema:   Range Of Motion:  AROM: Within functional limits                       Strength:    Strength: Generally decreased, functional                    Tone & Sensation:                                  Coordination:     Vision:      Functional Mobility:  Bed Mobility:     Supine to Sit: Stand-by assistance  Sit to Supine: Stand-by assistance     Transfers:  Sit to Stand: Minimum assistance  Stand to Sit: Minimum assistance                       Balance:   Sitting: Intact  Standing: Impaired  Standing - Static: Good  Standing - Dynamic : Fair  Ambulation/Gait Training:  Distance (ft): 3 Feet (ft)(side step along bedside)  Assistive Device: Gait belt(bilateral HHA)  Ambulation - Level of Assistance: Minimal assistance        Gait Abnormalities: Decreased step clearance;Trunk sway increased        Base of Support: Narrowed     Speed/Brook: Pace decreased (<100 feet/min)  Step Length: Right shortened;Left shortened         Functional Measure:  Tinetti test:    Sitting Balance: 1  Arises: 0  Attempts to Rise: 0  Immediate Standing Balance: 0  Standing Balance: 0  Nudged: 0  Eyes Closed: 0  Turn 360 Degrees - Continuous/Discontinuous: 0  Turn 360 Degrees - Steady/Unsteady: 0  Sitting Down: 1  Balance Score: 2  Indication of Gait: 1  R Step Length/Height: 1  L Step Length/Height: 1  R Foot Clearance: 1  L Foot Clearance: 1  Step Symmetry: 1  Step Continuity: 1  Path: 1  Trunk: 0  Walking Time: 1  Gait Score: 9  Total Score: 11         Tinetti Tool Score Risk of Falls  <19 = High Fall Risk  19-24 = Moderate Fall Risk  25-28 = Low Fall Risk  Tinetti ME.  Performance-Oriented Assessment of Mobility Problems in Elderly Patients. [facility] 66; [id number]. (Scoring Description: PT Bulletin Feb. 10, 1993)    Older adults: Palmer Hung et al, 2009; n = 1000 [facility] elderly evaluated with ABC, JOSE, ADL, and IADL)  · Mean JOSE score for males aged 69-68 years = 26.21(3.40)  · Mean JOSE score for females age 69-68 years = 25.16(4.30)  · Mean JOSE score for males over 80 years = 23.29(6.02)  · Mean JOSE score for females over 80 years = 17.20(8.32)          Based on the above components, the patient evaluation is determined to be of the following complexity level: LOW     Pain:  Pain Scale 1: Numeric (0 - 10)  Pain Intensity 1: 0              Activity Tolerance:   Good. VSS  Please refer to the flowsheet for vital signs taken during this treatment. After treatment:   []         Patient left in no apparent distress sitting up in chair  [x]         Patient left in no apparent distress in bed  [x]         Call bell left within reach  [x]         Nursing notified  []         Caregiver present  []         Bed alarm activated    COMMUNICATION/EDUCATION:   The patients plan of care was discussed with: Registered Nurse. [x]         Fall prevention education was provided and the patient/caregiver indicated understanding. [x]         Patient/family have participated as able in goal setting and plan of care. [x]         Patient/family agree to work toward stated goals and plan of care. []         Patient understands intent and goals of therapy, but is neutral about his/her participation. []         Patient is unable to participate in goal setting and plan of care.     Thank you for this referral.  Hakeem Guerra, PT, DPT   Time Calculation: 17 mins

## 2019-03-18 NOTE — PROGRESS NOTES
Renal Dosing/Monitoring  Medication: Famotidine   Current regimen:  20 mg PO BID  Recent Labs     03/18/19  0431 03/16/19  1629   CREA 0.75 0.88   BUN 17 15     Estimated CrCl:  ~40-50 ml/min  Plan: Change to 20 mg PO daily per [facility] P&T Committee Protocol with respect to renal function. Pharmacy will continue to monitor patient daily and will make dosage adjustments based upon changing renal function.

## 2019-03-18 NOTE — PROGRESS NOTES
Problem: Self Care Deficits Care Plan (Adult)  Goal: *Acute Goals and Plan of Care (Insert Text)  Occupational Therapy Goals  Initiated 3/18/2019   1. Patient will perform grooming standing at sink for 10 minutes with SBA within 7 day(s). 2.  Patient will perform upper body ADLs with supervision/set-up within 7 day(s). 3.  Patient will perform lower body ADLs with SBA within 7 day(s). 4.  Patient will perform toilet transfers with SBA within 7 day(s). 5.  Patient will perform all aspects of toileting with SBA within 7 day(s). 6.  Patient will participate in upper extremity therapeutic exercise/activities with supervision/set-up for 5 minutes within 7 day(s). 7.  Patient will utilize energy conservation techniques during functional activities with verbal cues within 7 day(s). 8.  Patient will improve their Fugl Mckeon score by 5 points in prep for ADLs within 7 days. Occupational Therapy EVALUATION  Patient: Shanthi Fleming (86 y.o. female)  Date: 3/18/2019  Primary Diagnosis: Stroke [facility]) [I63.9]       Precautions:  Fall    ASSESSMENT :  Based on the objective data described below, the patient presents with Setup upper body ADLs, Stand-by assistance - Minimum assistance lower body ADLs, and Contact guard assistance - Minimum assistance for functional mobility s/p admission for stroke. MRI showed L occipital hemorrhage, R parenchymal contusion and SAH. Patient typically mod I/IND with ADLs PTA, living at home with her . Patient unable to maintain sufficient attention to complete [employer] assessment. Patient demonstrating deficits in ~65% of ADLs at this time.     Next session: [employer] assessment    The following are barriers to independence while in acute care:   - Cognitive and/or behavioral: orientation, attention to task, processing, initiation, sequencing, safety awareness, insight into deficits and short term memory loss  - Medical condition: strength, functional reach, functional endurance, standing balance, medical history, motor planning and coordination    - Other:       Recommend with nursing patient to complete as able in order to maintain strength, endurance and independence: ADLs with supervision/setup, OOB to chair 3x/day and mobilizing to the [facility] for toileting with 1 assist. Thank you for your assistance. Patient will benefit from skilled acute intervention to address the above impairments. Patients rehabilitation potential is considered to be Good    Discharge recommendations: Rehab at inpatient facility: patient can tolerate 3 hours of therapy  If above is not an option then recommend: Rehab at skilled nursing facility (SNF)vs. [facility] with 24/7 supervision    Barriers to discharging home, in addition to above listed impairments: family availability to assist  significant other is elderly and unable to assist in patient care  level of physical assist required to maintain patient safety. Equipment recommendations for successful discharge (if) home: TBD - will continue to assess     PLAN :  Recommendations and Planned Interventions: self care training, functional mobility training, therapeutic exercise, balance training, therapeutic activities, endurance activities, neuromuscular re-education, patient education, home safety training and family training/education    Frequency/Duration: Patient will be followed by occupational therapy 5 times a week to address goals. SUBJECTIVE:   Patient stated The sooner I get the MRI, the sooner I go home.     OBJECTIVE DATA SUMMARY:   HISTORY:   Past Medical History:   Diagnosis Date    Asthma     Chronic obstructive pulmonary disease ([facility] [address]     COPD with asthma ([facility] [address]     Headache     Musculoskeletal disorder    No past surgical history on file. Prior Level of Function/Environment/Context: Patient reports living in a 2 level home with her  and being IND with ADLs PTA.  Patient noted to have difficulty processing orientation and background questions. Patient reports no use of DME at baseline. Home Situation  Home Environment: Private residence  # Steps to Enter: 4  Rails to Enter: Yes  Hand Rails : Bilateral  One/Two Story Residence: Two story  Living Alone: No  Support Systems: Spouse/Significant Other/Partner  Current DME Used/Available at Home: Grab bars  Tub or Shower Type: Tub/Shower combination    Hand dominance: Left    EXAMINATION OF PERFORMANCE DEFICITS:  Cognitive/Behavioral Status:  Neurologic State: Alert  Orientation Level: Oriented to person;Oriented to place;Oriented to situation;Disoriented to time  Cognition: Follows commands;Decreased attention/concentration  Perception: Appears intact  Perseveration: No perseveration noted  Safety/Judgement: Awareness of environment; Fall prevention    Skin: Appears grossly intact    Edema: none noted in BUEs    Hearing:       Vision/Perceptual:    Tracking: Able to track stimulus in all quadrants w/o difficulty    Diplopia: No    Acuity: Impaired near vision; Impaired far vision    Corrective Lenses: Glasses    Range of Motion:  In BUEs  AROM: Generally decreased, functional    Strength: In BUEs  Noted mild strength impairment in RUE  Strength: Generally decreased, functional(mild)    Coordination:  Coordination: Generally decreased, functional  Fine Motor Skills-Upper: Left Intact; Right Intact    Gross Motor Skills-Upper: Left Intact; Right Intact    Tone & Sensation:  In BUEs  Tone: Normal  Sensation: Intact    Balance:  Sitting: Intact  Standing: Impaired  Standing - Static: Good  Standing - Dynamic : Fair    Functional Mobility and Transfers for ADLs:  Bed Mobility:  Supine to Sit: Stand-by assistance  Sit to Supine: Stand-by assistance  Scooting: Stand-by assistance    Transfers:  Sit to Stand: Minimum assistance  Stand to Sit: Minimum assistance    ADL Assessment:  Feeding: Setup(infer)    Oral Facial Hygiene/Grooming: Setup;Contact guard assistance(Infer set-up-sitting, CGA standing )    Bathing: Minimum assistance(Infer per obs of func mob, func reach, balance)    Upper Body Dressing: Setup(Infer set-up-sitting, CGA standing )    Lower Body Dressing: Stand-by assistance;Contact guard assistance(SBA sitting, CGA standing )    Toileting: Minimum assistance(Infer per obs of func mob, balance, strength)    ADL Intervention and task modifications:     Lower Body Dressing Assistance  Socks: Stand-by assistance  Leg Crossed Method Used: Yes  Position Performed: Seated edge of bed  Cues: Doff;Verbal cues provided    Cognitive Retraining  Safety/Judgement: Awareness of environment; Fall prevention     Functional Measure:  Barthel Index:    Bathin  Bladder: 0(paez)  Bowels: 5  Groomin  Dressin  Feedin  Mobility: 5  Stairs: 0  Toilet Use: 5  Transfer (Bed to Chair and Back): 10  Total: 35/100        Percentage of impairment   0%   1-19%   20-39%   40-59%   60-79%   80-99%   100%   Barthel Score 0-100 100 99-80 79-60 59-40 20-39 1-19   0     The Barthel ADL Index: Guidelines  1. The index should be used as a record of what a patient does, not as a record of what a patient could do. 2. The main aim is to establish degree of independence from any help, physical or verbal, however minor and for whatever reason. 3. The need for supervision renders the patient not independent. 4. A patient's performance should be established using the best available evidence. Asking the patient, friends/relatives and nurses are the usual sources, but direct observation and common sense are also important. However direct testing is not needed. 5. Usually the patient's performance over the preceding 24-48 hours is important, but occasionally longer periods will be relevant. 6. Middle categories imply that the patient supplies over 50 per cent of the effort. 7. Use of aids to be independent is allowed. Melodie Hanley., Barthel, BAL. ([id number]).  Functional evaluation: the Barthel Index. [address] [facility] (14)2. MELI Day, [place]., Neeraj Vaz., Eduar [facility], [address] (1999). Measuring the change indisability after inpatient rehabilitation; comparison of the responsiveness of the Barthel Index and Functional [place] Measure. Journal of Neurology, Neurosurgery, and Psychiatry, 66(4), 111-549. MARY ANNE Faith, KAVITA Melgoza, & Mechelle Griffith M.A. (2004.) Assessment of post-stroke quality of life in cost-effectiveness studies: The usefulness of the Barthel Index and the EuroQoL-5D. Quality of Life Research, 15, 854-68       Occupational Therapy Evaluation Charge Determination   History Examination Decision-Making   LOW Complexity : Brief history review  LOW Complexity : 1-3 performance deficits relating to physical, cognitive , or psychosocial skils that result in activity limitations and / or participation restrictions  MEDIUM Complexity : Patient may present with comorbidities that affect occupational performnce. Miniml to moderate modification of tasks or assistance (eg, physical or verbal ) with assesment(s) is necessary to enable patient to complete evaluation       Based on the above components, the patient evaluation is determined to be of the following complexity level: LOW   Pain:  Pre treatment: 0 /10   During treatment: 0/10  Post treatment:  0/10   Location: none    Description: none   Aggravating factors: Activity Tolerance:   Fair  Please refer to the flowsheet for vital signs taken during this treatment. After treatment patient left:   Supine in bed  Bed in low position  Call light within reach  RN notified  Side rails x 3   COMMUNICATION/EDUCATION:   The patients plan of care was discussed with: Physical Therapist and Registered Nurse. Home safety education was provided and the patient/caregiver indicated understanding. and Patient/family have participated as able in goal setting and plan of care.     This patients plan of care is appropriate for delegation to SHAKIRA.     Thank you for this referral.  Tevin Saldaña, OT  Time Calculation: 14 mins

## 2019-03-18 NOTE — CDMP QUERY
Pt admitted with acute intracranial bleed, Encephalopathy documented. Please further specify type of Encephalopathy in the medical record    Hypertensive Encephalopathy  Metabolic Encephalopathy  Encephalopathy due to medications or drugs (please specify)  Toxic Metabolic Encephalopathy  Wernickes Encephalopathy  Other Encephalopathy  Other, please specify  Clinically unable to determine    The medical record reflects the following:       Risk Factors:  Acute intracranial bleed     Clinical Indicators:  Not following commands, restless and agitated, responds to voice and verbalizes name only. Speech incomprehensible.       Treatment: Hydralazine & labetalol IV prn SBP >140, neuro checks, Neuro surg following, MRI brain pending, use of restraints    Thank you,     Mary De La Cruz, RN, MSN, [employer] [facility], CDMP  [phone]

## 2019-03-18 NOTE — PROGRESS NOTES
Bedside shift change report given to Nessa Bruce RN (oncoming nurse) by Courtney Grande rN(offgoing nurse). Report included the following information SBAR, Kardex, Procedure Summary, Intake/Output, MAR, Recent Results, Med Rec Status, Cardiac Rhythm NSR and Alarm Parameters .

## 2019-03-18 NOTE — PROGRESS NOTES
Neurosurgery Progress Note  Mariama Lechuga, [facility]  [phone]        Admit Date: 3/16/2019   LOS: 2 days        Daily Progress Note: 3/18/2019      Subjective: The patient was apparently diagnosed with a UTI and treated for it. She was more lethargic for the past few days and complaining of a headache. She presented to the ER by EMS for AMS. Her head CT revealed an ICH with IVH. She was admitted to the ICU for further evaluation. She denies any daily blood thinners. She also denies being on BP meds at home though she states she felt she probably needed to be on blood pressure medications. She apparently was confused overnight, but this morning has come around and is more awake, alert, appropriate and conversing with me. She is currently sitting in bed eating jello. She has some headache, but denies nausea, vomiting, shortness of breath at this time. Objective:     Vital signs  Temp (24hrs), Av.2 °F (36.8 °C), Min:97.9 °F (36.6 °C), Max:98.8 °F (37.1 °C)    07 -  1900  In: 100 [I.V.:100]  Out: 250 [Urine:250]   190 -  0700  In: 3253.3 [I.V.:3253.3]  Out: 1560 [Urine:1560]    Visit Vitals  /67 (BP 1 Location: Left arm, BP Patient Position: At rest)   Pulse 80   Temp 98.8 °F (37.1 °C)   Resp (!) 38   Ht 5' 1.5\" (1.562 m)   Wt 49 kg (108 lb 0.4 oz)   SpO2 100%   BMI 20.08 kg/m²    O2 Flow Rate (L/min): 2 l/min O2 Device: Nasal cannula     Pain control  Pain Assessment  Pain Scale 1: Numeric (0 - 10)  Pain Intensity 1: 0    PT/OT  Gait                 Physical Exam:  Gen:NAD. Thin-appearing. Neuro: A&Ox3. Follows commands. Speech clear. Affect normal.  PERRL. EOMI. Face symmetric. Tongue midline. SY spontaneously. Strength 5/5 in UE and LE BL. Negative drift. Gait deferred. CT head without contrast on 19 at 1642 shows left greater than right intraventricular hemorrhage.  Parenchymal hemorrhage and vasogenic edema in the right temporal region, trace subarachnoid hemorrhage as well. Possible mass lesion in the right posterior temporal right occipital region    CT head without contrast on 03/16/19 at 2159 shows little significant change in the left occipital horn hemorrhage, left occipital lobe hemorrhage and areas of parenchymal contusion right temporal lobe  with adjacent edema. Small amount of subarachnoid hemorrhage is stable. The hemorrhage in the right occipital horn is less but there is slightly increased hemorrhage in the right temporal ventricle    24 hour results:    Recent Results (from the past 24 hour(s))   CBC WITH AUTOMATED DIFF    Collection Time: 03/18/19  4:31 AM   Result Value Ref Range    WBC 9.2 3.6 - 11.0 K/uL    RBC 3.47 (L) 3.80 - 5.20 M/uL    HGB 11.7 11.5 - 16.0 g/dL    HCT 37.2 35.0 - 47.0 %    .2 (H) 80.0 - 99.0 FL    MCH 33.7 26.0 - 34.0 PG    MCHC 31.5 30.0 - 36.5 g/dL    RDW 13.2 11.5 - 14.5 %    PLATELET 466 734 - 725 K/uL    MPV 10.6 8.9 - 12.9 FL    NRBC 0.0 0  WBC    ABSOLUTE NRBC 0.00 0.00 - 0.01 K/uL    NEUTROPHILS 78 (H) 32 - 75 %    LYMPHOCYTES 9 (L) 12 - 49 %    MONOCYTES 12 5 - 13 %    EOSINOPHILS 0 0 - 7 %    BASOPHILS 1 0 - 1 %    IMMATURE GRANULOCYTES 0 0.0 - 0.5 %    ABS. NEUTROPHILS 7.2 1.8 - 8.0 K/UL    ABS. LYMPHOCYTES 0.8 0.8 - 3.5 K/UL    ABS. MONOCYTES 1.1 (H) 0.0 - 1.0 K/UL    ABS. EOSINOPHILS 0.0 0.0 - 0.4 K/UL    ABS. BASOPHILS 0.1 0.0 - 0.1 K/UL    ABS. IMM.  GRANS. 0.0 0.00 - 0.04 K/UL    DF SMEAR SCANNED      RBC COMMENTS MACROCYTOSIS  1+       METABOLIC PANEL, COMPREHENSIVE    Collection Time: 03/18/19  4:31 AM   Result Value Ref Range    Sodium 150 (H) 136 - 145 mmol/L    Potassium 3.7 3.5 - 5.1 mmol/L    Chloride 117 (H) 97 - 108 mmol/L    CO2 28 21 - 32 mmol/L    Anion gap 5 5 - 15 mmol/L    Glucose 115 (H) 65 - 100 mg/dL    BUN 17 6 - 20 MG/DL    Creatinine 0.75 0.55 - 1.02 MG/DL    BUN/Creatinine ratio 23 (H) 12 - 20      GFR est AA >60 >60 ml/min/1.73m2    GFR est non-AA >60 >60 ml/min/1.73m2 Calcium 8.9 8.5 - 10.1 MG/DL    Bilirubin, total 1.0 0.2 - 1.0 MG/DL    ALT (SGPT) 21 12 - 78 U/L    AST (SGOT) 31 15 - 37 U/L    Alk. phosphatase 65 45 - 117 U/L    Protein, total 5.9 (L) 6.4 - 8.2 g/dL    Albumin 2.8 (L) 3.5 - 5.0 g/dL    Globulin 3.1 2.0 - 4.0 g/dL    A-G Ratio 0.9 (L) 1.1 - 2.2            Assessment:     Principal Problem:    ICH (intracerebral hemorrhage) ([facility] [address] (3/16/2019)        Plan:   1. Right temporal ICH with IVH   - Awaiting MRI to rule out mass lesion. Ativan PRN for MRI   - PT/OT evals   - Good SBP control   - Neuro checks every 4 hours   - Will start keppra for AED   - Ok to transfer to [facility]U  2. Brain compression with cerebral edema   - due to #1   - no steroids unless MRI reveals tumor   - plans as above  3. HTN   - SBP<140   - Pt getting multiple doses of PRN IVP meds   - will add Norvasc 5 mg PO daily   - Hydralazine/labetalol PRN  4.  Acute metabolic encephalopathy   - multi-factorial   - Improving   - Supportive care    ICH score 1  Activity: up with assist  DVT ppx: SCDs  Dispo: tbd    Plan d/w Dr. Nancy Schulz, ICU nurse, patient's       Jody Marroquin NP

## 2019-03-18 NOTE — PROGRESS NOTES
SPEECH THERAPY SCREENING:  SERVICES ARE NOT INDICATED AT THIS TIME    [facility] therapy based on results obtained during the nursing admission assessment. The patients chart was reviewed and the patient is not appropriate for a skilled therapy evaluation at this time. Please consult speech therapy if any therapy needs arise. Thank you. Susan Dillon, MS, CCC-SLP, BCS-S

## 2019-03-18 NOTE — PROGRESS NOTES
TRANSFER - IN REPORT:    Verbal report received from ICU RN(name) on Cindy Teague  being received from ICU(unit) for routine progression of care      Report consisted of patients Situation, Background, Assessment and   Recommendations(SBAR). Information from the following report(s) SBAR, Kardex, Procedure Summary, Accordion, Recent Results and Cardiac Rhythm NSR was reviewed with the receiving nurse. Opportunity for questions and clarification was provided. Assessment completed upon patients arrival to unit and care assumed.

## 2019-03-18 NOTE — PROGRESS NOTES
CM at bedside to perform initial assessment, patient sleeping at this time. CM to follow-up as able.     Jesus Manuel Wang RN, BSN  Care Management Department

## 2019-03-18 NOTE — CDMP QUERY
Patient admitted with acute intracranial bleed noted to have vasogenic edema. If possible, please document in progress notes and d/c summary if you are evaluating and/or treating any of the following:    => Cerebral edema with brain compression (POA)   => Other explanation of clinical findings  => Clinically Undetermined (no explanation for clinical findings)    The medical record reflects the following:       Risk Factors: Acute intracranial bleed    Clinical Indicators: CT head: Left greater than right intraventricular hemorrhage. Parenchymal hemorrhage and vasogenic edema in the right temporal region, trace subarachnoid hemorrhage as well. ..noted Altered mentation     Treatment: BP control  iwth Labetalol & Hydralazine, Neuro surg consulted, MRI of brain pending.      Thank you,     Ruth Hernandez RN, MSN, [facility], [address]  [phone]

## 2019-03-18 NOTE — PROGRESS NOTES
PCCM  Left occipital ICH vs mass. Awaiting MRI  NSGY has evaluated  Pt alert denies CP or SOB  Lung CTA   Heart- RRR no  M/r/g  Abd: benign  Labs reviewed    > can be transferred to [facility]STU - am labs ordered  > will see as needed.

## 2019-03-19 ENCOUNTER — APPOINTMENT (OUTPATIENT)
Dept: MRI IMAGING | Age: 79
DRG: 064 | End: 2019-03-19
Attending: NURSE PRACTITIONER
Payer: MEDICARE

## 2019-03-19 LAB
ANION GAP SERPL CALC-SCNC: 6 MMOL/L (ref 5–15)
BACTERIA SPEC CULT: NORMAL
BASOPHILS # BLD: 0.1 K/UL (ref 0–0.1)
BASOPHILS NFR BLD: 1 % (ref 0–1)
BUN SERPL-MCNC: 16 MG/DL (ref 6–20)
BUN/CREAT SERPL: 22 (ref 12–20)
CALCIUM SERPL-MCNC: 8.9 MG/DL (ref 8.5–10.1)
CC UR VC: NORMAL
CHLORIDE SERPL-SCNC: 114 MMOL/L (ref 97–108)
CO2 SERPL-SCNC: 28 MMOL/L (ref 21–32)
CREAT SERPL-MCNC: 0.73 MG/DL (ref 0.55–1.02)
DIFFERENTIAL METHOD BLD: ABNORMAL
EOSINOPHIL # BLD: 0.2 K/UL (ref 0–0.4)
EOSINOPHIL NFR BLD: 2 % (ref 0–7)
ERYTHROCYTE [DISTWIDTH] IN BLOOD BY AUTOMATED COUNT: 12.9 % (ref 11.5–14.5)
GLUCOSE SERPL-MCNC: 112 MG/DL (ref 65–100)
HCT VFR BLD AUTO: 37.6 % (ref 35–47)
HGB BLD-MCNC: 11.9 G/DL (ref 11.5–16)
IMM GRANULOCYTES # BLD AUTO: 0 K/UL (ref 0–0.04)
IMM GRANULOCYTES NFR BLD AUTO: 0 % (ref 0–0.5)
LYMPHOCYTES # BLD: 1.1 K/UL (ref 0.8–3.5)
LYMPHOCYTES NFR BLD: 13 % (ref 12–49)
MCH RBC QN AUTO: 33.6 PG (ref 26–34)
MCHC RBC AUTO-ENTMCNC: 31.6 G/DL (ref 30–36.5)
MCV RBC AUTO: 106.2 FL (ref 80–99)
MONOCYTES # BLD: 1 K/UL (ref 0–1)
MONOCYTES NFR BLD: 11 % (ref 5–13)
NEUTS SEG # BLD: 6.5 K/UL (ref 1.8–8)
NEUTS SEG NFR BLD: 73 % (ref 32–75)
NRBC # BLD: 0 K/UL (ref 0–0.01)
NRBC BLD-RTO: 0 PER 100 WBC
PLATELET # BLD AUTO: 183 K/UL (ref 150–400)
PMV BLD AUTO: 10.5 FL (ref 8.9–12.9)
POTASSIUM SERPL-SCNC: 2.8 MMOL/L (ref 3.5–5.1)
RBC # BLD AUTO: 3.54 M/UL (ref 3.8–5.2)
SERVICE CMNT-IMP: NORMAL
SODIUM SERPL-SCNC: 148 MMOL/L (ref 136–145)
WBC # BLD AUTO: 8.9 K/UL (ref 3.6–11)

## 2019-03-19 PROCEDURE — 80048 BASIC METABOLIC PNL TOTAL CA: CPT

## 2019-03-19 PROCEDURE — 74011250636 HC RX REV CODE- 250/636: Performed by: NURSE PRACTITIONER

## 2019-03-19 PROCEDURE — 36415 COLL VENOUS BLD VENIPUNCTURE: CPT

## 2019-03-19 PROCEDURE — 65660000000 HC RM CCU STEPDOWN

## 2019-03-19 PROCEDURE — 97116 GAIT TRAINING THERAPY: CPT

## 2019-03-19 PROCEDURE — 74011250636 HC RX REV CODE- 250/636: Performed by: INTERNAL MEDICINE

## 2019-03-19 PROCEDURE — 85025 COMPLETE CBC W/AUTO DIFF WBC: CPT

## 2019-03-19 PROCEDURE — A9575 INJ GADOTERATE MEGLUMI 0.1ML: HCPCS | Performed by: INTERNAL MEDICINE

## 2019-03-19 PROCEDURE — 70553 MRI BRAIN STEM W/O & W/DYE: CPT

## 2019-03-19 PROCEDURE — 74011250637 HC RX REV CODE- 250/637: Performed by: NURSE PRACTITIONER

## 2019-03-19 RX ORDER — GADOTERATE MEGLUMINE 376.9 MG/ML
10 INJECTION INTRAVENOUS
Status: COMPLETED | OUTPATIENT
Start: 2019-03-19 | End: 2019-03-19

## 2019-03-19 RX ADMIN — HYDRALAZINE HYDROCHLORIDE 10 MG: 20 INJECTION INTRAMUSCULAR; INTRAVENOUS at 17:42

## 2019-03-19 RX ADMIN — Medication 10 ML: at 17:37

## 2019-03-19 RX ADMIN — GADOTERATE MEGLUMINE 10 ML: 376.9 INJECTION INTRAVENOUS at 16:00

## 2019-03-19 RX ADMIN — LEVETIRACETAM 500 MG: 500 TABLET ORAL at 09:14

## 2019-03-19 RX ADMIN — Medication 10 ML: at 06:59

## 2019-03-19 RX ADMIN — FAMOTIDINE 20 MG: 20 TABLET ORAL at 09:14

## 2019-03-19 RX ADMIN — AMLODIPINE BESYLATE 5 MG: 5 TABLET ORAL at 09:14

## 2019-03-19 RX ADMIN — ACETAMINOPHEN 650 MG: 325 TABLET ORAL at 17:42

## 2019-03-19 RX ADMIN — LEVETIRACETAM 500 MG: 500 TABLET ORAL at 17:37

## 2019-03-19 NOTE — CDMP QUERY
Pt noted to have suspected urinary tract infection. If possible, please document in the progress notes and d/c summary if you are evaluating and / or treating any of the following: UTI-ruled out-not POA 
UTI -ruled in-POA Other, please specify Clinically unable to determine The medical record reflects the following: 
    
Risk Factors: AMS, noted doc of component of encephalopathy from UTI Clinical Indicators: PT's UA noted to have negative bacteria and f/u urine culture (03/16): <1,000 CFU/ML. Final 
Culture result:  NO GROWTH 1 DAY Treatment: urine culture, monitor VS, not on abx. Thank you, Marybel Madrigal, RN, MSN, [facility], [address] 
[phone]

## 2019-03-19 NOTE — PROGRESS NOTES
Hospitalist Progress Note Lizbet Elias MD 
Answering service: [phone] OR [phone] from in house phone Date of Service:  3/19/2019 NAME:  Manjula Roque :  1940 MRN:  [id number] Admission Summary:  
C/C; AMS 76 y/o F with PMH of asthma/copd,recently dx with UTI,noted more lethargic ,confused & restless by family Her head CT revealed an ICH with IVH,trace SAH. She was initially admitted to the [facility] 15 transferred to [facility] Interval history / Subjective:  
  
Alert,denies acute symptoms Assessment & Plan:  
 
Encephalopathy  d/t ICH/metabolic  
-neuro checks  
-Pending MRI  To rule out mass  
-maintain SBP <140 
-neuro sx following ,started on keppra 
-ST/PT/OT Brain compression d/t  
-no steroids per ,unless MRI show mass per neurosx Intracranial hemorrhage of unclear etiology.   
Possible hemorrhagic stroke. Mariana Contreras is no report of significant head trauma. Suspected urinary tract infection. -UA neg, urine cx neg Underlying dementia with recent decline. Chronic obstructive pulmonary disease/asthma Code status:Full DVT prophylaxis: scd Disposition:TBD Hospital Problems  Date Reviewed: 3/12/2019 Codes Class Noted POA * (Principal) ICH (intracerebral hemorrhage) ([facility] [facility] ICD-10-CM: I61.9 ICD-9-CM: 016  3/16/2019 Yes Review of Systems: A comprehensive review of systems was negative except for that written in the HPI. Vital Signs:  
 Last 24hrs VS reviewed since prior progress note. Most recent are: 
Visit Vitals /64 (BP 1 Location: Right arm, BP Patient Position: At rest;Head of bed elevated (Comment degrees)) Pulse 67 Temp 98.7 °F (37.1 °C) Resp 22 Ht 5' 1.5\" (1.562 m) Wt 50.6 kg (111 lb 8 oz) SpO2 95% BMI 20.73 kg/m² Intake/Output Summary (Last 24 hours) at 3/19/2019 1229 Last data filed at 3/19/2019 2079 Gross per 24 hour Intake 360 ml Output 275 ml Net 85 ml Physical Examination:  
 
HEENT:  no head trauma. NECK:  Supple. LUNGS:  Clear to auscultation bilaterally. HEART:  Rhythm is regular. ABDOMEN:  Soft. EXTREMITIES:  Lower extremity examination shows no edema or rashes. NEUROLOGIC:  The patient is able to move her upper and lower extremities symmetrically and is uncooperative with formal neurologic exam 
 
 
    
  
 
Data Review:  
 Review and/or order of clinical lab test 
 
 
Labs:  
 
Recent Labs  
  03/19/19 
0230 03/18/19 
0431 WBC 8.9 9.2 HGB 11.9 11.7 HCT 37.6 37.2  206 Recent Labs  
  03/19/19 
0230 03/18/19 
0431 03/16/19 
1629 * 150* 144  
K 2.8* 3.7 3.3*  
* 117* 105 CO2 28 28 33* BUN 16 17 15 CREA 0.73 0.75 0.88 * 115* 131* CA 8.9 8.9 9.8 Recent Labs  
  03/18/19 
0431 03/16/19 
1629 SGOT 31 21 ALT 21 23 AP 65 87 TBILI 1.0 0.9 TP 5.9* 6.7 ALB 2.8* 3.4*  
GLOB 3.1 3.3 Recent Labs  
  03/16/19 
1659 INR 1.1 PTP 11.4* APTT 24.3 No results for input(s): FE, TIBC, PSAT, FERR in the last 72 hours. Lab Results Component Value Date/Time Folate 18.0 03/11/2019 03:46 PM  
  
No results for input(s): PH, PCO2, PO2 in the last 72 hours. No results for input(s): CPK, CKNDX, TROIQ in the last 72 hours. No lab exists for component: CPKMB No results found for: CHOL, CHOLX, CHLST, CHOLV, HDL, LDL, LDLC, DLDLP, TGLX, TRIGL, TRIGP, CHHD, CHHDX No results found for: [employer] O[employer] Lab Results Component Value Date/Time  Color YELLOW/STRAW 03/16/2019 10:33 PM  
 Appearance CLEAR 03/16/2019 10:33 PM  
 Specific gravity 1.016 03/16/2019 10:33 PM  
 pH (UA) 7.0 03/16/2019 10:33 PM  
 Protein TRACE (A) 03/16/2019 10:33 PM  
 Glucose NEGATIVE  03/16/2019 10:33 PM  
 Ketone NEGATIVE  03/16/2019 10:33 PM  
 Bilirubin NEGATIVE  03/16/2019 10:33 PM  
 Urobilinogen 0.2 03/16/2019 10:33 PM  
 Nitrites NEGATIVE  03/16/2019 10:33 PM  
 Leukocyte Esterase NEGATIVE  03/16/2019 10:33 PM  
 Epithelial cells FEW 03/16/2019 10:33 PM  
 Bacteria NEGATIVE  03/16/2019 10:33 PM  
 WBC 0-4 03/16/2019 10:33 PM  
 RBC 0-5 03/16/2019 10:33 PM  
 
 
 
Medications Reviewed:  
 
Current Facility-Administered Medications Medication Dose Route Frequency  LORazepam (ATIVAN) injection 0.5 mg  0.5 mg IntraVENous ONCE PRN  
 acetaminophen (TYLENOL) tablet 650 mg  650 mg Oral Q4H PRN  
 HYDROcodone-acetaminophen (NORCO) 5-325 mg per tablet 1 Tab  1 Tab Oral Q4H PRN  
 famotidine (PEPCID) tablet 20 mg  20 mg Oral DAILY  levETIRAcetam (KEPPRA) tablet 500 mg  500 mg Oral BID  amLODIPine (NORVASC) tablet 5 mg  5 mg Oral DAILY  labetalol (NORMODYNE;TRANDATE) 20 mg/4 mL (5 mg/mL) injection 10 mg  10 mg IntraVENous Q4H PRN  
 hydrALAZINE (APRESOLINE) 20 mg/mL injection 10 mg  10 mg IntraVENous Q4H PRN  
 albuterol-ipratropium (DUO-NEB) 2.5 MG-0.5 MG/3 ML  3 mL Nebulization Q6H PRN  
 sodium chloride (NS) flush 5-40 mL  5-40 mL IntraVENous Q8H  
 sodium chloride (NS) flush 5-40 mL  5-40 mL IntraVENous PRN  
 
______________________________________________________________________ EXPECTED LENGTH OF STAY: 2d 2h 
ACTUAL LENGTH OF STAY:          3 Roberth Chi MD

## 2019-03-19 NOTE — PROGRESS NOTES
Bedside shift change report given to Aixa (oncoming nurse) by Cecil Martinez (offgoing nurse). Report included the following information SBAR, Kardex, Procedure Summary, MAR, Accordion, Recent Results and Cardiac Rhythm NSR.

## 2019-03-19 NOTE — PROGRESS NOTES
A Spiritual Care Partner Volunteer visited patient in Rm 662 on 3/19/2019. Documented by: 
Chaplain Gonsales MDiv, MS, [address] [place] [address] PRA ([id number])

## 2019-03-19 NOTE — PROGRESS NOTES
Problem: Mobility Impaired (Adult and Pediatric) Goal: *Acute Goals and Plan of Care (Insert Text) Physical Therapy Goals Initiated 3/18/2019 1. Patient will move from supine to sit and sit to supine  in bed with supervision/set-up within 7 day(s). 2.  Patient will transfer from bed to chair and chair to bed with supervision/set-up using the least restrictive device within 7 day(s). 3.  Patient will perform sit to stand with supervision/set-up within 7 day(s). 4.  Patient will ambulate with minimal assistance/contact guard assist for 100 feet with the least restrictive device within 7 day(s). 5.  Patient will ascend/descend 12 stairs with single handrail(s) with minimal assist  within 7 day(s). 6.  Patient will improve Mensah Balance score by 7 points within 7 days. physical Therapy TREATMENT Patient: Kendrick Cadena (1 y.o. female) Date: 3/19/2019 Diagnosis: Stroke ([facility] [address] [I63.9] ICH (intracerebral hemorrhage) ([facility] Precautions: Fall Chart, physical therapy assessment, plan of care and goals were reviewed. ASSESSMENT: 
Cleared for mobility by RN. Received supine in bed, agreeable to participation in session. Pt participated in progressive gait training without AD as per PLOF - requires constant HHA with noted attempts to reach out for additional external stabilization and demos NBOS, ataxia, intermittent scissoring, path drifts, and multiple LOBs with head movements and turn management. Question visual inattention. Appears SOB with activity - SpO2 desat to 87% on room air - recovered to 91% with several minutes of seated rest and cues for PLB strategies. Pt is a very high falls risk - acknowledges that her balance is off however question overall full insight to deficits and safety. Recommending discharge to acute IP rehab once medically cleared. Progression toward goals: 
[x]       Improving appropriately and progressing toward goals []       Improving slowly and progressing toward goals 
[]       Not making progress toward goals and plan of care will be adjusted PLAN: 
Patient continues to benefit from skilled intervention to address the above impairments. Continue treatment per established plan of care. Discharge Recommendations:  Inpatient Rehab Further Equipment Recommendations for Discharge:  TBD SUBJECTIVE:  
Patient stated My balance is definitely off but I wouldn't fall.  OBJECTIVE DATA SUMMARY:  
Critical Behavior: 
Neurologic State: Alert Orientation Level: Oriented to person, Oriented to place, Oriented to situation, Disoriented to time Cognition: Follows commands Safety/Judgement: Awareness of environment, Fall prevention Functional Mobility Training: 
Bed Mobility: 
Supine to Sit: Stand-by assistance Transfers: 
Sit to Stand: Minimum assistance Stand to Sit: Contact guard assistance Balance: 
Sitting: Intact Standing: Impaired Standing - Static: Fair Standing - Dynamic : Fair;Poor;Constant supportAmbulation/Gait Training: 
Distance (ft): 90 Feet (ft) Assistive Device: Gait belt(HHA) Ambulation - Level of Assistance: Minimal assistance Gait Abnormalities: Ataxic;Decreased step clearance; Path deviations;Scissoring;Trunk sway increased Base of Support: Narrowed Speed/Brook: Slow Step Length: Left shortened;Right shortened Pain: 
Pain Scale 1: Numeric (0 - 10) Pain Intensity 1: 0 Activity Tolerance:  
SpO2 desat to 87% on room air with activity,  RN notified Please refer to the flowsheet for vital signs taken during this treatment. After treatment:  
[x] Patient left in no apparent distress sitting up in chair 
[] Patient left in no apparent distress in bed 
[x] Call bell left within reach [x] Nursing notified 
[] Caregiver present [x] chair alarm activated COMMUNICATION/EDUCATION:  
The patients plan of care was discussed with: Registered Nurse. Patient was educated regarding Her deficit(s) of impaired balance as this relates to Her diagnosis of CVA. She demonstrated Fair understanding as evidenced by nodding. Patient and/or family was verbally educated on the BE FAST acronym for signs/symptoms of CVA and TIA. BE FAST was written on patient's communication board  for visual education and reinforcement. All questions answered with patient indicating good understanding. [x]  Fall prevention education was provided and the patient/caregiver indicated understanding. [x]  Patient/family have participated as able in goal setting and plan of care. [x]  Patient/family agree to work toward stated goals and plan of care. []  Patient understands intent and goals of therapy, but is neutral about his/her participation. []  Patient is unable to participate in goal setting and plan of care. Thank you for this referral. 
Sangeetha Puckett, PT, DPT Time Calculation: 26 mins

## 2019-03-19 NOTE — PROGRESS NOTES
Hospitalist Service  Progress Note    Daily Progress Note: 3/18/2019    Assessment/Plan:   ASSESSMENT:  1. encephalopathy  probably related to the acute intracranial bleed  Pending MRI  ADMITTED TO [facility]  2. Intracranial hemorrhage of unclear etiology. Possible hemorrhagic stroke. There is no report of significant head trauma. 3.  Suspected urinary tract infection. 4.  Underlying dementia with recent decline.   5.  Chronic obstructive pulmonary disease/asthma                 Subjective:   encephalopatic    Review of Systems:       Objective:   Physical examination  Visit Vitals  /54 (BP 1 Location: Left arm, BP Patient Position: At rest)   Pulse 73   Temp 97.9 °F (36.6 °C)   Resp 23   Ht 5' 1.5\" (1.562 m)   Wt 49 kg (108 lb 0.4 oz)   SpO2 93%   BMI 20.08 kg/m²      Temp (24hrs), Av.2 °F (36.8 °C), Min:97.9 °F (36.6 °C), Max:98.8 °F (37.1 °C)     O2 Flow Rate (L/min): 2 l/min   O2 Device: Room air  Patient Vitals for the past 24 hrs:   Temp Pulse Resp BP SpO2   19 2044 97.9 °F (36.6 °C) 73 23 148/54 93 %   19 1900  (!) 59 26 125/51 93 %   19 1800  70 26 129/51 95 %   19 1700  74 26 125/43 93 %   19 1600 98.2 °F (36.8 °C) 66 28 141/43 97 %   19 1500  67 27 133/49 100 %   19 1400  88 (!) 35 (!) 153/134 (!) 84 %   19 1350  80  133/51    19 1300  70 23 145/56 100 %   19 1200 98.8 °F (37.1 °C) 80 (!) 38 164/67 100 %   19 1157  80  146/59    19 1100  70 (!) 31 141/71 (!) 87 %   19 1000  60 27 (!) 141/116 99 %   19 0938  70  164/67    19 0900  72 (!) 33 (!) 159/134    19 0800 98.2 °F (36.8 °C) 64 29 133/56 100 %   19 0700  84 23 (!) 151/98 97 %   19 0600  66 29 129/51 96 %   19 0500  75 30 129/53 97 %   19 0442     94 %   19 0400 98.3 °F (36.8 °C) 75 (!) 31 148/84 97 %   19 0300  77 30 140/58    19 0200  74 (!) 32 124/54 100 %   03/18/19 0100  72 29 128/51 100 %   03/18/19 0000 97.9 °F (36.6 °C) 87 (!) 32 150/55 94 %   03/17/19 2300  92 30 149/44 97 %   03/17/19 2200  99 27 154/48 96 %       Intake/Output Summary (Last 24 hours) at 3/18/2019 2104  Last data filed at 3/18/2019 1809  Gross per 24 hour   Intake 1100 ml   Output 845 ml   Net 255 ml     Last shift:    No intake/output data recorded. Last 3 shifts:    03/17 0701 - 03/18 1900  In: 2600 [I.V.:2600]  Out: 1345 [Urine:1345]    General:   Encephalopatic, no acute distress   Head:   Atraumatic   Eyes:   Conjunctivae clear   ENT:  Oral mucosa normal   Neck:  Supple, trachea midline, no adenopathy   No JVD   Back:    No CVA tenderness    Chest wall:    No tenderness or deformities    Lungs:   Clear to auscultation bilaterally    Heart:   Regular rhythm, no murmur   Abdomen:    Soft, non-tender   No masses or organomegaly    Extremities:  No edema or DVT signs   Pulses:  Symmetric all extremities   Skin:  Warm and dry    No rashes or lesions   Neurologic:   No focal deficits   Psychiatric:          Data Review:       Recent Labs     03/18/19  0431 03/16/19  1629   WBC 9.2 8.5   HGB 11.7 14.4   HCT 37.2 43.3    264     Recent Labs     03/18/19  0431 03/16/19  1659 03/16/19  1629   *  --  144   K 3.7  --  3.3*   *  --  105   CO2 28  --  33*   *  --  131*   BUN 17  --  15   CREA 0.75  --  0.88   CA 8.9  --  9.8   ALB 2.8*  --  3.4*   SGOT 31  --  21   ALT 21  --  23   INR  --  1.1  --      No results for input(s): PH, PCO2, PO2, HCO3, FIO2 in the last 72 hours.     Lab Results   Component Value Date/Time    Glucose 115 (H) 03/18/2019 04:31 AM        All Micro Results     Procedure Component Value Units Date/Time    CULTURE, URINE [[id number]] Collected:  03/16/19 2233    Order Status:  Completed Specimen:  Edge Specimen Updated:  03/18/19 1054        No results found for: SDES  No results found for: CULT  Medications reviewed  Current Facility-Administered Medications   Medication Dose Route Frequency    LORazepam (ATIVAN) injection 0.5 mg  0.5 mg IntraVENous ONCE PRN    acetaminophen (TYLENOL) tablet 650 mg  650 mg Oral Q4H PRN    HYDROcodone-acetaminophen (NORCO) 5-325 mg per tablet 1 Tab  1 Tab Oral Q4H PRN    [START ON 3/19/2019] famotidine (PEPCID) tablet 20 mg  20 mg Oral DAILY    levETIRAcetam (KEPPRA) tablet 500 mg  500 mg Oral BID    amLODIPine (NORVASC) tablet 5 mg  5 mg Oral DAILY    labetalol (NORMODYNE;TRANDATE) 20 mg/4 mL (5 mg/mL) injection 10 mg  10 mg IntraVENous Q4H PRN    hydrALAZINE (APRESOLINE) 20 mg/mL injection 10 mg  10 mg IntraVENous Q4H PRN    albuterol-ipratropium (DUO-NEB) 2.5 MG-0.5 MG/3 ML  3 mL Nebulization Q6H PRN    sodium chloride (NS) flush 5-40 mL  5-40 mL IntraVENous Q8H    sodium chloride (NS) flush 5-40 mL  5-40 mL IntraVENous PRN         Signed:   Isis Gaitan MD   Internist / Hospitalist

## 2019-03-20 LAB — POTASSIUM SERPL-SCNC: 3 MMOL/L (ref 3.5–5.1)

## 2019-03-20 PROCEDURE — 36415 COLL VENOUS BLD VENIPUNCTURE: CPT

## 2019-03-20 PROCEDURE — 65660000000 HC RM CCU STEPDOWN

## 2019-03-20 PROCEDURE — 74011250637 HC RX REV CODE- 250/637: Performed by: NURSE PRACTITIONER

## 2019-03-20 PROCEDURE — 84132 ASSAY OF SERUM POTASSIUM: CPT

## 2019-03-20 PROCEDURE — 97535 SELF CARE MNGMENT TRAINING: CPT

## 2019-03-20 RX ADMIN — Medication 10 ML: at 21:29

## 2019-03-20 RX ADMIN — Medication 10 ML: at 06:00

## 2019-03-20 RX ADMIN — LEVETIRACETAM 500 MG: 500 TABLET ORAL at 18:04

## 2019-03-20 RX ADMIN — FAMOTIDINE 20 MG: 20 TABLET ORAL at 08:46

## 2019-03-20 RX ADMIN — Medication 10 ML: at 14:30

## 2019-03-20 RX ADMIN — LEVETIRACETAM 500 MG: 500 TABLET ORAL at 08:46

## 2019-03-20 RX ADMIN — AMLODIPINE BESYLATE 5 MG: 5 TABLET ORAL at 08:46

## 2019-03-20 NOTE — PROGRESS NOTES
Hospitalist Progress Note Timothy Benson MD 
Answering service: [phone] OR [phone] from in house phone Date of Service:  3/20/2019 NAME:  Mary Jefferson :  1940 MRN:  [id number] Admission Summary:  
C/C; AMS 78 y/o F with PMH of asthma/copd,recently dx with UTI,noted more lethargic ,confused & restless by family Her head CT revealed an ICH with IVH,trace SAH. Interval history / Subjective:  
  
Alert,confused Assessment & Plan:  
 
Encephalopathy  d/ Rt temporal ICH /metabolic  
-neuro checks  
-MRI  ,showed no evidence of tumor hmge through tumor cannot be ruled out -NSx f/u ,advise repeat MRI in 6 weeks to r/o underlying mass after hmge resolves 
-maintain SBP <140 
-neuro sx following , on keppra 
-PT/OT recc SNF 
-ok to d/c. Per NeuroSx Brain compression d/t  
-no steroids indicated per NS Suspected urinary tract infection. -UA neg, urine cx neg 
-no abx Underlying dementia with recent decline. /mild intermittent asthma 
-stable 
-albuterol prn Hypokalemia 
-replacement Repeat levels Code status:Full DVT prophylaxis: scd Disposition: stable for discharger to SNF , once available Hospital Problems  Date Reviewed: 3/12/2019 Codes Class Noted POA * (Principal) [facility] ICD-10-CM: I61.9 ICD-9-CM: 069  3/16/2019 Yes Review of Systems: A comprehensive review of systems was negative except for that written in the HPI. Vital Signs:  
 Last 24hrs VS reviewed since prior progress note. Most recent are: 
Visit Vitals /51 (BP 1 Location: Left arm, BP Patient Position: At rest;Post activity; Sitting) Pulse 72 Temp 97 °F (36.1 °C) Resp 14 Ht 5' 1.5\" (1.562 m) Wt 50.8 kg (112 lb) SpO2 93% BMI 20.82 kg/m² No intake or output data in the 24 hours ending 19 1358 Physical Examination: HEENT:  no head trauma. NECK:  Supple. LUNGS:  Clear to auscultation bilaterally. HEART:  Rhythm is regular. ABDOMEN:  Soft. EXTREMITIES:  Lower extremity examination shows no edema or rashes. NEUROLOGIC:  The patient is able to move her upper and lower extremities symmetrically and is uncooperative with formal neurologic exam 
 
 
    
  
 
Data Review:  
 Review and/or order of clinical lab test 
 
 
Labs:  
 
Recent Labs  
  03/19/19 
0230 03/18/19 0431 WBC 8.9 9.2 HGB 11.9 11.7 HCT 37.6 37.2  206 Recent Labs  
  03/19/19 
0230 03/18/19 0431 * 150*  
K 2.8* 3.7 * 117* CO2 28 28 BUN 16 17 CREA 0.73 0.75 * 115* CA 8.9 8.9 Recent Labs  
  03/18/19 0431 SGOT 31 ALT 21 AP 65 TBILI 1.0 TP 5.9* ALB 2.8*  
GLOB 3.1 No results for input(s): INR, PTP, APTT in the last 72 hours. No lab exists for component: INREXT, INREXT No results for input(s): FE, TIBC, PSAT, FERR in the last 72 hours. Lab Results Component Value Date/Time Folate 18.0 03/11/2019 03:46 PM  
  
No results for input(s): PH, PCO2, PO2 in the last 72 hours. No results for input(s): CPK, CKNDX, TROIQ in the last 72 hours. No lab exists for component: CPKMB No results found for: CHOL, CHOLX, CHLST, CHOLV, HDL, LDL, LDLC, DLDLP, TGLX, TRIGL, TRIGP, CHHD, CHHDX No results found for: Shaun Spar Lab Results Component Value Date/Time  Color YELLOW/STRAW 03/16/2019 10:33 PM  
 Appearance CLEAR 03/16/2019 10:33 PM  
 Specific gravity 1.016 03/16/2019 10:33 PM  
 pH (UA) 7.0 03/16/2019 10:33 PM  
 Protein TRACE (A) 03/16/2019 10:33 PM  
 Glucose NEGATIVE  03/16/2019 10:33 PM  
 Ketone NEGATIVE  03/16/2019 10:33 PM  
 Bilirubin NEGATIVE  03/16/2019 10:33 PM  
 Urobilinogen 0.2 03/16/2019 10:33 PM  
 Nitrites NEGATIVE  03/16/2019 10:33 PM  
 Leukocyte Esterase NEGATIVE  03/16/2019 10:33 PM  
 Epithelial cells FEW 03/16/2019 10:33 PM  
 Bacteria NEGATIVE  03/16/2019 10:33 PM  
 WBC 0-4 03/16/2019 10:33 PM  
 RBC 0-5 03/16/2019 10:33 PM  
 
 
 
Medications Reviewed:  
 
Current Facility-Administered Medications Medication Dose Route Frequency  acetaminophen (TYLENOL) tablet 650 mg  650 mg Oral Q4H PRN  
 HYDROcodone-acetaminophen (NORCO) 5-325 mg per tablet 1 Tab  1 Tab Oral Q4H PRN  
 famotidine (PEPCID) tablet 20 mg  20 mg Oral DAILY  levETIRAcetam (KEPPRA) tablet 500 mg  500 mg Oral BID  amLODIPine (NORVASC) tablet 5 mg  5 mg Oral DAILY  labetalol (NORMODYNE;TRANDATE) 20 mg/4 mL (5 mg/mL) injection 10 mg  10 mg IntraVENous Q4H PRN  
 hydrALAZINE (APRESOLINE) 20 mg/mL injection 10 mg  10 mg IntraVENous Q4H PRN  
 albuterol-ipratropium (DUO-NEB) 2.5 MG-0.5 MG/3 ML  3 mL Nebulization Q6H PRN  
 sodium chloride (NS) flush 5-40 mL  5-40 mL IntraVENous Q8H  
 sodium chloride (NS) flush 5-40 mL  5-40 mL IntraVENous PRN  
 
______________________________________________________________________ EXPECTED LENGTH OF STAY: 2d 2h 
ACTUAL LENGTH OF STAY:          4 Autumn Roberts MD

## 2019-03-20 NOTE — PROGRESS NOTES
Bedside shift change report given to Alpesh (oncoming nurse) by Opal Serna (offgoing nurse).  Report included the following information SBAR, Kardex, Procedure Summary, MAR, Accordion, Recent Results and Cardiac Rhythm NSr.

## 2019-03-20 NOTE — PROGRESS NOTES
Spiritual Care Assessment/Progress Note [address] 
 
 
NAME: Angelita Sanchez      MRN: [id number] AGE: 77 y.o. SEX: female Mormon Affiliation: Corrinai Language: English  
 
3/20/2019     Total Time (in minutes): 10 Spiritual Assessment begun in [address] [place] [address] 6S NEURO-SC[employer] TELE through conversation with: 
  
    []Patient        [] Family    [] Friend(s) Reason for Consult: Initial/Spiritual assessment, critical care Spiritual beliefs: (Please include comment if needed) 
   [] Identifies with a aleena tradition:     
   [] Supported by a aleena community:        
   [] Claims no spiritual orientation:       
   [] Seeking spiritual identity:            
   [] Adheres to an individual form of spirituality:       
   [x] Not able to assess:                   
 
    
Identified resources for coping:  
   [] Prayer                           
   [] Music                  [] Guided Imagery 
   [] Family/friends                 [] Pet visits [] Devotional reading                         [x] Unknown 
   [] Other:                                    
 
 
Interventions offered during this visit: (See comments for more details) Patient Interventions: Other (comment) Plan of Care: 
 
 [] Support spiritual and/or cultural needs  
 [] Support AMD and/or advance care planning process    
 [] Support grieving process 
 [] Coordinate Rites and/or Rituals  
 [] Coordination with community clergy [] No spiritual needs identified at this time 
 [] Detailed Plan of Care below (See Comments)  [] Make referral to Music Therapy 
[] Make referral to Pet Therapy    
[] Make referral to Addiction services 
[] Make referral to [facility] 
[] Make referral to Spiritual Care Partner 
[] No future visits requested       
[x] Follow up visits as needed Attempted Initial Spiritual Assessment in Room 662/01. Unable to assess patients needs at this time patient is being attended to by staff.   No family present at this time. Chaplains will follow as able and/or as needed. Rev. Yue Verdin. Milka Phipps

## 2019-03-20 NOTE — PROGRESS NOTES
Reason for Admission:   CVA RRAT Score:  22 Resources/supports as identified by patient/family:  Pt's  and daughter are supportive. Top Challenges facing patient (as identified by patient/family and CM): Finances/Medication cost?    No. Pt has Medicare A and B as well as a supplemental insurance. Transportation? No. Pt's  can drive. Support system or lack thereof? See above. Living arrangements? This pt lives with her . Self-care/ADLs/Cognition? Prior to admission, this pt was independent with ADL's. Current Advanced Directive/Advance Care Plan:   
                      Not on file. Plan for utilizing home health:  TBD Likelihood of readmission: high Transition of Care Plan:      CM met with pt and her  at pt's bedside. CM introduced them to the role of CM and transition of care. Pt's  verbalized understanding. This pt lives at home with her . Prior to admission,  stated that this pt was independent with ADL's, active and still driving. CM informed them that therapy is recommending Inpatient Rehab for this pt. Cm offered choice. Pt's  would like a referral to go to [address] [place] [address]. CM sent a referral to [address] [place] [address] via [employer]. [facility] Interventions PCP Verified by CM: Yes 
Palliative Care Criteria Met (RRAT>21 & CHF Dx)?: No 
Transition of Care Consult (CM Consult): Discharge Planning MyChart Signup: No 
Discharge Durable Medical Equipment: No 
Physical Therapy Consult: Yes Occupational Therapy Consult: Yes Speech Therapy Consult: No 
Current Support Network: Lives with Spouse Confirm Follow Up Transport: Family Plan discussed with Pt/Family/Caregiver: Yes Freedom of Choice Offered: Yes [employer] Information Provided?: No

## 2019-03-20 NOTE — PROGRESS NOTES
Neurosurgery Progress Note [facility] [place] 
[phone] Admit Date: 3/16/2019 LOS: 4 days Daily Progress Note: 3/20/2019 HPI: The patient was apparently diagnosed with a UTI and treated for it. She was more lethargic for the past few days and complaining of a headache. She presented to the ER by EMS for AMS. Her head CT revealed an ICH with IVH. She was admitted to the ICU for further evaluation. She denies any daily blood thinners. She also denies being on BP meds at home though she states she felt she probably needed to be on blood pressure medications. Subjective: The patient's MRI was completed. It shows no evidence of tumor, just hemorrhage though a tumor cannot be completely ruled out. Pt had some confusion last night. Objective:  
 
Vital signs Temp (24hrs), Av.9 °F (36.6 °C), Min:97 °F (36.1 °C), Max:98.9 °F (37.2 °C) No intake/output data recorded.  1901 -  0700 In: 800 [P.O.:800] Out: - Visit Vitals /43 (BP 1 Location: Left arm, BP Patient Position: At rest) Pulse 66 Temp 97 °F (36.1 °C) Resp 23 Ht 5' 1.5\" (1.562 m) Wt 50.8 kg (112 lb) SpO2 91% BMI 20.82 kg/m² O2 Flow Rate (L/min): 2 l/min O2 Device: Room air Pain control Pain Assessment Pain Scale 1: Numeric (0 - 10) Pain Intensity 1: 0 
 
PT/OT Gait     Gait Base of Support: Narrowed Speed/Brook: Slow Step Length: Left shortened, Right shortened Gait Abnormalities: Ataxic, Decreased step clearance, Path deviations, Scissoring, Trunk sway increased Ambulation - Level of Assistance: Minimal assistance Distance (ft): 90 Feet (ft) Assistive Device: Gait belt(HHA) Physical Exam: 
Gen:NAD. Thin-appearing. Neuro: A&Ox3. Follows commands. Speech clear. Affect normal. 
PERRL. EOMI. Face symmetric. Tongue midline. SY spontaneously. Strength 5-/5 in UE and LE BL. Negative drift. Gait deferred. CT head without contrast on 03/16/19 at 1642 shows left greater than right intraventricular hemorrhage. Parenchymal hemorrhage and vasogenic edema in the right temporal region, trace subarachnoid hemorrhage as well. Possible mass lesion in the right posterior temporal right occipital region CT head without contrast on 03/16/19 at 2159 shows little significant change in the left occipital horn hemorrhage, left occipital lobe hemorrhage and areas of parenchymal contusion right temporal lobe with adjacent edema. Small amount of subarachnoid hemorrhage is stable. The hemorrhage in the right occipital horn is less but there is slightly increased hemorrhage in the right temporal ventricle MRI brain with and without contrast on 03/19/19 shows no significant change in subacute /acute intracranial hemorrhage in the right temporal lobe, subarachnoid space and occipital horns of both ventricles as above. Difficult to assess for underlying enhancement, no definite mass though follow-up after hemorrhage involves/resolved recommended to exclude underlying lesion. 
  
 
24 hour results: No results found for this or any previous visit (from the past 24 hour(s)). Assessment:  
 
Principal Problem: 
  ICH (intracerebral hemorrhage) ([facility] (3/16/2019) Plan: 1. Right temporal ICH with IVH 
 - MRI shows no mass lesion. Recommend repeat MRI in 6 weeks to rule out an underlying mass after the hemorrhage resolves 
 - PT/OT evals - Good SBP control 
 - Neuro checks every 4 hours - Cont Keppra for 6 weeks - Stroke education 
 - Ok to transfer to rehab when accepted 2. Brain compression with cerebral edema 
 - due to #1 
 - steroids not indicated - plans as above 3. HTN 
 - SBP<140 
 - Pt getting multiple doses of PRN IVP meds 
 - Cont Norvasc 5 mg PO daily - Hydralazine/labetalol PRN 
 - F/U at PCP as outpatient with log of BP 
 - Hospitalist following 4.  Acute metabolic encephalopathy 
 - multi-factorial 
 - Improving - Supportive care 5. Smoker 
 - discussed smoking cessation and how smoking is a risk factor for future hemorrhagic strokes with the patient ICH score 1 Activity: up with assist 
DVT ppx: SCDs Dispo: inpt rehab [facility] . Will have pt follow-up with Dr. Jonatan Arce office in 6 weeks for an outpatient MRI. The patient is to call the office for a clinic appointment and the hospital will call the patient to schedule the MRI prior to the clinic appointment.   
 
 
Rivka Katz, MICHELLE

## 2019-03-21 ENCOUNTER — HOSPITAL ENCOUNTER (OUTPATIENT)
Dept: REHABILITATION | Age: 79
End: 2019-04-06
Attending: PHYSICAL MEDICINE & REHABILITATION | Admitting: PHYSICAL MEDICINE & REHABILITATION

## 2019-03-21 VITALS
HEART RATE: 80 BPM | SYSTOLIC BLOOD PRESSURE: 141 MMHG | DIASTOLIC BLOOD PRESSURE: 43 MMHG | TEMPERATURE: 97 F | BODY MASS INDEX: 19.1 KG/M2 | HEIGHT: 62 IN | RESPIRATION RATE: 18 BRPM | WEIGHT: 103.8 LBS | OXYGEN SATURATION: 94 %

## 2019-03-21 LAB
ANION GAP SERPL CALC-SCNC: 6 MMOL/L (ref 5–15)
BUN SERPL-MCNC: 16 MG/DL (ref 6–20)
BUN/CREAT SERPL: 25 (ref 12–20)
CALCIUM SERPL-MCNC: 8.6 MG/DL (ref 8.5–10.1)
CHLORIDE SERPL-SCNC: 110 MMOL/L (ref 97–108)
CO2 SERPL-SCNC: 28 MMOL/L (ref 21–32)
COMMENT, HOLDF: NORMAL
CREAT SERPL-MCNC: 0.63 MG/DL (ref 0.55–1.02)
GLUCOSE SERPL-MCNC: 93 MG/DL (ref 65–100)
POTASSIUM SERPL-SCNC: 2.7 MMOL/L (ref 3.5–5.1)
SAMPLES BEING HELD,HOLD: NORMAL
SODIUM SERPL-SCNC: 144 MMOL/L (ref 136–145)

## 2019-03-21 PROCEDURE — 77030029684 HC NEB SM VOL KT MONA -A

## 2019-03-21 PROCEDURE — 74011250636 HC RX REV CODE- 250/636: Performed by: INTERNAL MEDICINE

## 2019-03-21 PROCEDURE — 74011250636 HC RX REV CODE- 250/636

## 2019-03-21 PROCEDURE — 74011250637 HC RX REV CODE- 250/637: Performed by: NURSE PRACTITIONER

## 2019-03-21 PROCEDURE — 80048 BASIC METABOLIC PNL TOTAL CA: CPT

## 2019-03-21 PROCEDURE — 74011000250 HC RX REV CODE- 250: Performed by: INTERNAL MEDICINE

## 2019-03-21 PROCEDURE — 36415 COLL VENOUS BLD VENIPUNCTURE: CPT

## 2019-03-21 PROCEDURE — 94640 AIRWAY INHALATION TREATMENT: CPT

## 2019-03-21 RX ORDER — POTASSIUM CHLORIDE 7.45 MG/ML
10 INJECTION INTRAVENOUS
Status: DISCONTINUED | OUTPATIENT
Start: 2019-03-21 | End: 2019-03-21 | Stop reason: CLARIF

## 2019-03-21 RX ORDER — POTASSIUM CHLORIDE 7.45 MG/ML
INJECTION INTRAVENOUS
Status: COMPLETED
Start: 2019-03-21 | End: 2019-03-21

## 2019-03-21 RX ORDER — POTASSIUM CHLORIDE 14.9 MG/ML
10 INJECTION INTRAVENOUS
Status: DISPENSED | OUTPATIENT
Start: 2019-03-21 | End: 2019-03-21

## 2019-03-21 RX ORDER — POTASSIUM CHLORIDE 1500 MG/1
40 TABLET, FILM COATED, EXTENDED RELEASE ORAL DAILY
Qty: 3 TAB | Refills: 0 | Status: SHIPPED | OUTPATIENT
Start: 2019-03-22

## 2019-03-21 RX ORDER — POTASSIUM CHLORIDE 750 MG/1
40 TABLET, FILM COATED, EXTENDED RELEASE ORAL DAILY
Status: DISCONTINUED | OUTPATIENT
Start: 2019-03-22 | End: 2019-03-21 | Stop reason: HOSPADM

## 2019-03-21 RX ORDER — AMLODIPINE BESYLATE 5 MG/1
5 TABLET ORAL DAILY
Qty: 30 TAB | Refills: 0 | Status: SHIPPED | OUTPATIENT
Start: 2019-03-22

## 2019-03-21 RX ORDER — LEVETIRACETAM 500 MG/1
500 TABLET ORAL 2 TIMES DAILY
Qty: 80 TAB | Refills: 0 | Status: SHIPPED | OUTPATIENT
Start: 2019-03-21 | End: 2019-04-30

## 2019-03-21 RX ADMIN — Medication 10 ML: at 06:00

## 2019-03-21 RX ADMIN — POTASSIUM CHLORIDE 10 MEQ: 10 INJECTION, SOLUTION INTRAVENOUS at 03:37

## 2019-03-21 RX ADMIN — LEVETIRACETAM 500 MG: 500 TABLET ORAL at 09:17

## 2019-03-21 RX ADMIN — POTASSIUM CHLORIDE 10 MEQ: 10 INJECTION, SOLUTION INTRAVENOUS at 05:35

## 2019-03-21 RX ADMIN — FAMOTIDINE 20 MG: 20 TABLET ORAL at 09:17

## 2019-03-21 RX ADMIN — POTASSIUM CHLORIDE 10 MEQ: 200 INJECTION, SOLUTION INTRAVENOUS at 07:53

## 2019-03-21 RX ADMIN — IPRATROPIUM BROMIDE AND ALBUTEROL SULFATE 3 ML: .5; 3 SOLUTION RESPIRATORY (INHALATION) at 14:05

## 2019-03-21 RX ADMIN — POTASSIUM CHLORIDE 10 MEQ: 200 INJECTION, SOLUTION INTRAVENOUS at 09:20

## 2019-03-21 RX ADMIN — AMLODIPINE BESYLATE 5 MG: 5 TABLET ORAL at 09:17

## 2019-03-21 NOTE — PROGRESS NOTES
Bedside and Verbal shift change report given to Alena Mejia RN (oncoming nurse) by Michelle Pierre RN (offgoing nurse). Report included the following information SBAR, Kardex, ED Summary, Procedure Summary, Intake/Output, MAR, Accordion, Recent Results and Med Rec Status.

## 2019-03-21 NOTE — DISCHARGE INSTRUCTIONS
PATIENT ID: Ysabel Roe  MRN: [id number]   YOB: 1940    DATE OF ADMISSION: 3/16/2019  7:22 PM    DATE OF DISCHARGE: 03/21/19  PRIMARY CARE PROVIDER: Jozef Kennedy MD     ATTENDING PHYSICIAN:   DISCHARGING PROVIDER: Srinivas Martel MD    To contact this individual call [phone] and ask the  to page. If unavailable ask to be transferred the Adult Hospitalist Department. CONSULTATIONS: None    PROCEDURES/SURGERIES: * No surgery found *    ADMITTING DIAGNOSES & HOSPITAL COURSE:   [address] y/o F with PMH of asthma/copd,recently dx with UTI and treated for it noted more lethargic ,confused & restless reported  by family brought to ER via EMS. In the ER  head CT revealed an ICH with IVH,trace SAH. Hospital Course     Rt temporal [address] /metabolic   -neurosurgery consulted, pt initially monitored in the ICU      CT head without contrast on 03/16/19 at 1642 shows left greater than right intraventricular hemorrhage. Parenchymal hemorrhage and vasogenic edema in the right temporal region, trace subarachnoid hemorrhage as well. Possible mass lesion in the right posterior temporal right occipital region     CT head without contrast on 03/16/19 at 2159 shows little significant change in the left occipital horn hemorrhage, left occipital lobe hemorrhage and areas of parenchymal contusion right temporal lobe with adjacent edema. Small amount of subarachnoid hemorrhage is stable. The hemorrhage in the right occipital horn is less but there is slightly increased hemorrhage in the right temporal ventricle     MRI brain with and without contrast on 03/19/19 shows no significant change in subacute /acute intracranial hemorrhage in the right temporal lobe, subarachnoid space and occipital horns of both ventricles as above.  Difficult to assess for underlying enhancement, no definite mass though follow-up after hemorrhage involves/resolved recommended to exclude underlying lesion.       -neuro checks  monitored serially  -MRI 03/19 ,showed no evidence of tumor hmge through tumor cannot be ruled out  -NSx f/u ,advise repeat MRI in 6 weeks to r/o underlying mass after hmge resolves  -maintain SBP <140,currently at goal   -has been started keppra,contine for 6 weeks  -PT/OT recc SNF  -ok to d/c. Per NeuroSx     Brain compression d/t   -no steroids indicated per NS    Encephalopathy due to above  -improved mentation, still has cognitive impairment    UTI ruled out   Recently treated for UTI  -UA neg, urine cx neg  -no abx     Underlying dementia with recent decline.  -with cognitive impairment     COPD/mild intermittent asthma  -stable  -albuterol prn     Hypokalemia  -replaced with IV k  -add po potassium   -Repeat levels     F/u labs, recheck potassium/BMP- 03/22/19         FOLLOW UP APPOINTMENTS:    Follow-up Information     Follow up With Specialties Details Why Contact Info    Meena Dotson MD Neurosurgery Schedule an appointment as soon as possible for a visit in 6 weeks for MRI review. The patient is to call the office to schedule the MRI review appointment. The hospital will call the patient to schedule the MRI prior to the MRI review appointment Renetta Spicer [address] [place] [address]. [facility]  [phone]             DIET: Regular Diet      ACTIVITY: PT/OT Eval and Treat        DISCHARGE MEDICATIONS:  Current Discharge Medication List            NOTIFY YOUR PHYSICIAN FOR ANY OF THE FOLLOWING:   Fever over 101 degrees for 24 hours. Chest pain, shortness of breath, fever, chills, nausea, vomiting, diarrhea, change in mentation, falling, weakness, bleeding. Severe pain or pain not relieved by medications. Or, any other signs or symptoms that you may have questions about.     DISPOSITION:    Home With:   OT  PT  HH  RN      x Inpatient Rehab    Independent/assisted living    Hospice    Other:       PATIENT CONDITION AT DISCHARGE:     Functional status   x Poor     Deconditioned     Independent      Cognition     Lucid    x Forgetful     Dementia      Catheters/lines (plus indication)    Edge     PICC     PEG    x None      Code status   x  Full code     DNR      Current Discharge Medication List      START taking these medications    Details   amLODIPine (NORVASC) 5 mg tablet Take 1 Tab by mouth daily. Qty: 30 Tab, Refills: 0      levETIRAcetam (KEPPRA) 500 mg tablet Take 1 Tab by mouth two (2) times a day for 40 days. Qty: 80 Tab, Refills: 0      potassium chloride SR (K-TAB) 20 mEq tablet Take 2 Tabs by mouth daily. Qty: 3 Tab, Refills: 0         CONTINUE these medications which have NOT CHANGED    Details   zolpidem (AMBIEN) 5 mg tablet TK 1 T PO HS PRN  Refills: 0      magnesium 30 mg tab Take  by mouth. cholecalciferol, vitamin D3, (VITAMIN D3 PO) Take  by mouth. albuterol (VENTOLIN HFA) 90 mcg/actuation inhaler Take 2 Puffs by inhalation every four (4) hours as needed for Wheezing.          STOP taking these medications       predniSONE (DELTASONE) 10 mg tablet Comments:   Reason for Stopping:         benzonatate (TESSALON PERLES) 100 mg capsule Comments:   Reason for Stopping:

## 2019-03-21 NOTE — PROGRESS NOTES
Neurosurgery Progress Note Date: 3/21/2019 Admit Date: 3/16/2019 LOS: 5 days Chief Complaint:  confusion Interval History: looks much better Subjective: No acute complaints Objective:  
 
Patient Vitals for the past 8 hrs: 
 BP Temp Pulse Resp SpO2  
19 1405     94 % 19 1000 141/45 97 °F (36.1 °C) 69 20  Temp (24hrs), Av.8 °F (36.6 °C), Min:97 °F (36.1 °C), Max:98.4 °F (36.9 °C) No intake/output data recorded. Physical Exam:   
General : NAD Follows commands readily. Awake, Alert, oriented x3   Speech fluent. Recent and remote memory intact. PERRL EOMI, face symmetric, tongue and uvula midline, shoulder shrug nl. No pronator drift. Motor 5/5, Sensation intact. Reflexes intact. Labs:   
Recent Results (from the past 24 hour(s)) POTASSIUM Collection Time: 19  6:06 PM  
Result Value Ref Range Potassium 3.0 (L) 3.5 - 5.1 mmol/L METABOLIC PANEL, BASIC Collection Time: 19  2:04 AM  
Result Value Ref Range Sodium 144 136 - 145 mmol/L Potassium 2.7 (LL) 3.5 - 5.1 mmol/L Chloride 110 (H) 97 - 108 mmol/L  
 CO2 28 21 - 32 mmol/L Anion gap 6 5 - 15 mmol/L Glucose 93 65 - 100 mg/dL BUN 16 6 - 20 MG/DL Creatinine 0.63 0.55 - 1.02 MG/DL  
 BUN/Creatinine ratio 25 (H) 12 - 20 GFR est AA >60 >60 ml/min/1.73m2 GFR est non-AA >60 >60 ml/min/1.73m2 Calcium 8.6 8.5 - 10.1 MG/DL  
SAMPLES BEING HELD Collection Time: 19  2:10 AM  
Result Value Ref Range SAMPLES BEING HELD 1LAV   
 COMMENT Add-on orders for these samples will be processed based on acceptable specimen integrity and analyte stability, which may vary by analyte. Assessment:  
 
Principal Problem: 
  ICH (intracerebral hemorrhage) ([facility] (3/16/2019) Plan: MRI shows likely hemorrhage, but cannot completely exclude underlying lesion. OK to go from my standpoint. Will need follow up  with my office in 6 weeks for an outpatient MRI. The patient is to call the office for a clinic appointment and the hospital will call the patient to schedule the MRI prior to the clinic appointment.  
Frank Parrish MD 
35 minutes were spent with the patient, over half of which was face to face  counseling and coordination of care, discussing with nursing, obtaining history, examining patient and discussing treatment plans.

## 2019-03-21 NOTE — DISCHARGE SUMMARY
Discharge Summary       PATIENT ID: Shanthi Fleming  MRN: [id number]   YOB: 1940    DATE OF ADMISSION: 3/16/2019  7:22 PM    DATE OF DISCHARGE: 03/21/19  PRIMARY CARE PROVIDER: Boni Vazquez MD     ATTENDING PHYSICIAN:   DISCHARGING PROVIDER: Ponce Leiva MD    To contact this individual call [phone] and ask the  to page. If unavailable ask to be transferred the Adult Hospitalist Department. CONSULTATIONS: None    PROCEDURES/SURGERIES: * No surgery found *    ADMITTING DIAGNOSES & HOSPITAL COURSE:   76 y/o F with PMH of asthma/copd,recently dx with UTI and treated for it noted more lethargic ,confused & restless reported  by family brought to ER via EMS. In the ER  head CT revealed an ICH with IVH,trace SAH. Hospital Course     Rt temporal [address] /metabolic   -neurosurgery consulted, pt initially monitored in the ICU      CT head without contrast on 03/16/19 at 1642 shows left greater than right intraventricular hemorrhage. Parenchymal hemorrhage and vasogenic edema in the right temporal region, trace subarachnoid hemorrhage as well. Possible mass lesion in the right posterior temporal right occipital region     CT head without contrast on 03/16/19 at 2159 shows little significant change in the left occipital horn hemorrhage, left occipital lobe hemorrhage and areas of parenchymal contusion right temporal lobe with adjacent edema. Small amount of subarachnoid hemorrhage is stable. The hemorrhage in the right occipital horn is less but there is slightly increased hemorrhage in the right temporal ventricle     MRI brain with and without contrast on 03/19/19 shows no significant change in subacute /acute intracranial hemorrhage in the right temporal lobe, subarachnoid space and occipital horns of both ventricles as above.  Difficult to assess for underlying enhancement, no definite mass though follow-up after hemorrhage involves/resolved recommended to exclude underlying lesion.       -neuro checks  monitored serially  -MRI 03/19 ,showed no evidence of tumor hmge through tumor cannot be ruled out  -NSx f/u ,advise repeat MRI in 6 weeks to r/o underlying mass after hmge resolves  -maintain SBP <140,currently at goal   -has been started keppra,contine for 6 weeks  -PT/OT recc SNF  -ok to d/c. Per NeuroSx     Brain compression d/t   -no steroids indicated per NS    Encephalopathy due to above  -improved mentation, still has cognitive impairment    UTI ruled out   Recently treated for UTI  -UA neg, urine cx neg  -no abx     Underlying dementia with recent decline.  -with cognitive impairment     COPD/mild intermittent asthma  -stable  -albuterol prn     Hypokalemia  -replaced with IV k  -add po potassium   -Repeat levels             FOLLOW UP APPOINTMENTS:    Follow-up Information     Follow up With Specialties Details Why Contact Info    Ruddy Arceo MD Neurosurgery Schedule an appointment as soon as possible for a visit in 6 weeks for MRI review. The patient is to call the office to schedule the MRI review appointment. The hospital will call the patient to schedule the MRI prior to the MRI review appointment Renetta Spicer [address] [place] [id number] [facility], [address] [place] [address],[address]  [phone]               DIET: Regular Diet      ACTIVITY: PT/OT Eval and Treat        DISCHARGE MEDICATIONS:  Current Discharge Medication List      START taking these medications    Details   amLODIPine (NORVASC) 5 mg tablet Take 1 Tab by mouth daily. Qty: 30 Tab, Refills: 0      levETIRAcetam (KEPPRA) 500 mg tablet Take 1 Tab by mouth two (2) times a day for 40 days. Qty: 80 Tab, Refills: 0      potassium chloride SR (K-TAB) 20 mEq tablet Take 2 Tabs by mouth daily.   Qty: 3 Tab, Refills: 0 CONTINUE these medications which have NOT CHANGED    Details   zolpidem (AMBIEN) 5 mg tablet TK 1 T PO HS PRN  Refills: 0      magnesium 30 mg tab Take  by mouth. cholecalciferol, vitamin D3, (VITAMIN D3 PO) Take  by mouth. albuterol (VENTOLIN HFA) 90 mcg/actuation inhaler Take 2 Puffs by inhalation every four (4) hours as needed for Wheezing. STOP taking these medications       predniSONE (DELTASONE) 10 mg tablet Comments:   Reason for Stopping:         benzonatate (TESSALON PERLES) 100 mg capsule Comments:   Reason for Stopping:                 NOTIFY YOUR PHYSICIAN FOR ANY OF THE FOLLOWING:   Fever over 101 degrees for 24 hours. Chest pain, shortness of breath, fever, chills, nausea, vomiting, diarrhea, change in mentation, falling, weakness, bleeding. Severe pain or pain not relieved by medications. Or, any other signs or symptoms that you may have questions about. DISPOSITION:    Home With:   OT  PT  HH  RN      x Inpatient Rehab    Independent/assisted living    Hospice    Other:       PATIENT CONDITION AT DISCHARGE:     Functional status   x Poor     Deconditioned     Independent      Cognition     Lucid    x Forgetful     Dementia      Catheters/lines (plus indication)    Edge     PICC     PEG    x None      Code status   x  Full code     DNR      PHYSICAL EXAMINATION AT DISCHARGE:  /43 (BP 1 Location: Left arm, BP Patient Position: At rest)   Pulse 80   Temp 97 °F (36.1 °C)   Resp 18   Ht 5' 1.5\" (1.562 m)   Wt 47.1 kg (103 lb 12.8 oz)   SpO2 94%   BMI 19.30 kg/m²   HEENT:  no head trauma. NECK:  Supple. LUNGS:  Clear to auscultation bilaterally. HEART: Dash Abbe is regular. ABDOMEN:  Soft. EXTREMITIES:  Lower extremity examination shows no edema or rashes.   NEUROLOGIC: pleasantly confused AA oriented to herself,place not to time ,able to follow simple commands  The patient is able to move her upper and lower extremities symmetrically         CHRONIC MEDICAL DIAGNOSES:  Problem List as of 3/21/2019 Date Reviewed: 3/12/2019          Codes Class Noted - Resolved    * (Principal) ICH (intracerebral hemorrhage) ([facility] ICD-10-CM: I61.9  ICD-9-CM: 901  3/16/2019 - Present        Leukopenia ICD-10-CM: D72.819  ICD-9-CM: 288.50  2/10/2018 - Present        Thrombocytopenia ([facility] ICD-10-CM: D69.6  ICD-9-CM: 287.5  2/10/2018 - Present        COPD exacerbation ([facility] ICD-10-CM: J44.1  ICD-9-CM: 491.21  2/9/2018 - Present        Tobacco abuse (Chronic) ICD-10-CM: Z72.0  ICD-9-CM: 305.1  2/9/2018 - Present        Hypoxia ICD-10-CM: R09.02  ICD-9-CM: 799.02  2/9/2018 - Present        RESOLVED: Hypokalemia ICD-10-CM: E87.6  ICD-9-CM: 276.8  2/10/2018 - 2/11/2018        RESOLVED: Hypernatremia ICD-10-CM: E87.0  ICD-9-CM: 276.0  2/9/2018 - 2/11/2018              Greater than 35 minutes were spent with the patient on counseling and coordination of care    Signed:   Al Bryson MD  3/21/2019  3:23 PM

## 2019-03-21 NOTE — PROGRESS NOTES
CM received a call from Kelle Kc for [facility]. She stated that she can accept this pt today at the [address] [place] [address] location. CM called the attending to inform her and she is in agreement. CM also informed pt and her . Both are in agreement. CM set up ambulance  transportation with [facility] for 4pm today. An envelope containing pt's kardex, MAR, AVS and emtala will be sent with pt to [facility]. Also, pt's nurse will call report. Franky Kulkarni

## 2019-03-22 LAB
25(OH)D3 SERPL-MCNC: 51.1 NG/ML (ref 30–100)
ALBUMIN SERPL-MCNC: 2.5 G/DL (ref 3.5–5)
ALBUMIN/GLOB SERPL: 0.9 {RATIO} (ref 1.1–2.2)
ALP SERPL-CCNC: 68 U/L (ref 45–117)
ALT SERPL-CCNC: 18 U/L (ref 12–78)
AMORPH CRY URNS QL MICRO: ABNORMAL
ANION GAP SERPL CALC-SCNC: 6 MMOL/L (ref 5–15)
APPEARANCE UR: CLEAR
AST SERPL-CCNC: 12 U/L (ref 15–37)
BACTERIA URNS QL MICRO: NEGATIVE /HPF
BILIRUB SERPL-MCNC: 1.2 MG/DL (ref 0.2–1)
BILIRUB UR QL: NEGATIVE
BUN SERPL-MCNC: 13 MG/DL (ref 6–20)
BUN/CREAT SERPL: 21 (ref 12–20)
CALCIUM SERPL-MCNC: 8.9 MG/DL (ref 8.5–10.1)
CHLORIDE SERPL-SCNC: 111 MMOL/L (ref 97–108)
CO2 SERPL-SCNC: 27 MMOL/L (ref 21–32)
COLOR UR: ABNORMAL
CREAT SERPL-MCNC: 0.61 MG/DL (ref 0.55–1.02)
EPITH CASTS URNS QL MICRO: ABNORMAL /LPF
ERYTHROCYTE [DISTWIDTH] IN BLOOD BY AUTOMATED COUNT: 12.1 % (ref 11.5–14.5)
GLOBULIN SER CALC-MCNC: 2.7 G/DL (ref 2–4)
GLUCOSE SERPL-MCNC: 85 MG/DL (ref 65–100)
GLUCOSE UR STRIP.AUTO-MCNC: NEGATIVE MG/DL
HCT VFR BLD AUTO: 35.2 % (ref 35–47)
HGB BLD-MCNC: 11.8 G/DL (ref 11.5–16)
HGB UR QL STRIP: ABNORMAL
KETONES UR QL STRIP.AUTO: NEGATIVE MG/DL
LEUKOCYTE ESTERASE UR QL STRIP.AUTO: NEGATIVE
MAGNESIUM SERPL-MCNC: 1.6 MG/DL (ref 1.6–2.4)
MCH RBC QN AUTO: 33.6 PG (ref 26–34)
MCHC RBC AUTO-ENTMCNC: 33.5 G/DL (ref 30–36.5)
MCV RBC AUTO: 100.3 FL (ref 80–99)
NITRITE UR QL STRIP.AUTO: NEGATIVE
NRBC # BLD: 0 K/UL (ref 0–0.01)
NRBC BLD-RTO: 0 PER 100 WBC
PH UR STRIP: 7 [PH] (ref 5–8)
PLATELET # BLD AUTO: 214 K/UL (ref 150–400)
PMV BLD AUTO: 10.4 FL (ref 8.9–12.9)
POTASSIUM SERPL-SCNC: 2.9 MMOL/L (ref 3.5–5.1)
PROT SERPL-MCNC: 5.2 G/DL (ref 6.4–8.2)
PROT UR STRIP-MCNC: NEGATIVE MG/DL
RBC # BLD AUTO: 3.51 M/UL (ref 3.8–5.2)
RBC #/AREA URNS HPF: ABNORMAL /HPF (ref 0–5)
SODIUM SERPL-SCNC: 144 MMOL/L (ref 136–145)
SP GR UR REFRACTOMETRY: 1.01 (ref 1–1.03)
UA: UC IF INDICATED,UAUC: ABNORMAL
UROBILINOGEN UR QL STRIP.AUTO: 1 EU/DL (ref 0.2–1)
WBC # BLD AUTO: 6.3 K/UL (ref 3.6–11)
WBC URNS QL MICRO: ABNORMAL /HPF (ref 0–4)

## 2019-03-22 PROCEDURE — 82306 VITAMIN D 25 HYDROXY: CPT

## 2019-03-22 PROCEDURE — 81001 URINALYSIS AUTO W/SCOPE: CPT

## 2019-03-22 PROCEDURE — 85027 COMPLETE CBC AUTOMATED: CPT

## 2019-03-22 PROCEDURE — 80053 COMPREHEN METABOLIC PANEL: CPT

## 2019-03-22 PROCEDURE — 83735 ASSAY OF MAGNESIUM: CPT

## 2019-03-22 PROCEDURE — 36415 COLL VENOUS BLD VENIPUNCTURE: CPT

## 2019-03-23 LAB
ANION GAP SERPL CALC-SCNC: 4 MMOL/L (ref 5–15)
BUN SERPL-MCNC: 10 MG/DL (ref 6–20)
BUN/CREAT SERPL: 19 (ref 12–20)
CALCIUM SERPL-MCNC: 8.9 MG/DL (ref 8.5–10.1)
CHLORIDE SERPL-SCNC: 114 MMOL/L (ref 97–108)
CO2 SERPL-SCNC: 28 MMOL/L (ref 21–32)
CREAT SERPL-MCNC: 0.54 MG/DL (ref 0.55–1.02)
GLUCOSE SERPL-MCNC: 92 MG/DL (ref 65–100)
POTASSIUM SERPL-SCNC: 3.7 MMOL/L (ref 3.5–5.1)
SODIUM SERPL-SCNC: 146 MMOL/L (ref 136–145)

## 2019-03-23 PROCEDURE — 80048 BASIC METABOLIC PNL TOTAL CA: CPT

## 2019-03-23 PROCEDURE — 36415 COLL VENOUS BLD VENIPUNCTURE: CPT

## 2019-03-25 LAB
ANION GAP SERPL CALC-SCNC: 3 MMOL/L (ref 5–15)
APPEARANCE UR: ABNORMAL
BACTERIA URNS QL MICRO: ABNORMAL /HPF
BILIRUB UR QL: NEGATIVE
BUN SERPL-MCNC: 12 MG/DL (ref 6–20)
BUN/CREAT SERPL: 16 (ref 12–20)
CALCIUM SERPL-MCNC: 10.2 MG/DL (ref 8.5–10.1)
CHLORIDE SERPL-SCNC: 106 MMOL/L (ref 97–108)
CO2 SERPL-SCNC: 29 MMOL/L (ref 21–32)
COLOR UR: ABNORMAL
COMMENT, HOLDF: NORMAL
CREAT SERPL-MCNC: 0.74 MG/DL (ref 0.55–1.02)
EPITH CASTS URNS QL MICRO: ABNORMAL /LPF
ERYTHROCYTE [DISTWIDTH] IN BLOOD BY AUTOMATED COUNT: 11.8 % (ref 11.5–14.5)
ERYTHROCYTE [DISTWIDTH] IN BLOOD BY AUTOMATED COUNT: NORMAL % (ref 11.5–14.5)
GLUCOSE SERPL-MCNC: 115 MG/DL (ref 65–100)
GLUCOSE UR STRIP.AUTO-MCNC: NEGATIVE MG/DL
HCT VFR BLD AUTO: 41.6 % (ref 35–47)
HCT VFR BLD AUTO: NORMAL % (ref 35–47)
HGB BLD-MCNC: 13.7 G/DL (ref 11.5–16)
HGB BLD-MCNC: NORMAL G/DL (ref 11.5–16)
HGB UR QL STRIP: ABNORMAL
KETONES UR QL STRIP.AUTO: NEGATIVE MG/DL
LEUKOCYTE ESTERASE UR QL STRIP.AUTO: ABNORMAL
MCH RBC QN AUTO: 33.4 PG (ref 26–34)
MCH RBC QN AUTO: NORMAL PG (ref 26–34)
MCHC RBC AUTO-ENTMCNC: 32.9 G/DL (ref 30–36.5)
MCHC RBC AUTO-ENTMCNC: NORMAL G/DL (ref 30–36.5)
MCV RBC AUTO: 101.5 FL (ref 80–99)
MCV RBC AUTO: NORMAL FL (ref 80–99)
NITRITE UR QL STRIP.AUTO: NEGATIVE
NRBC # BLD: 0 K/UL (ref 0–0.01)
NRBC # BLD: NORMAL K/UL (ref 0–0.01)
NRBC BLD-RTO: 0 PER 100 WBC
NRBC BLD-RTO: NORMAL PER 100 WBC
OTHER,OTHU: ABNORMAL
PH UR STRIP: 7.5 [PH] (ref 5–8)
PLATELET # BLD AUTO: 251 K/UL (ref 150–400)
PLATELET # BLD AUTO: NORMAL K/UL (ref 150–400)
PMV BLD AUTO: 9.8 FL (ref 8.9–12.9)
PMV BLD AUTO: NORMAL FL (ref 8.9–12.9)
POTASSIUM SERPL-SCNC: 5.3 MMOL/L (ref 3.5–5.1)
PROT UR STRIP-MCNC: 30 MG/DL
RBC # BLD AUTO: 4.1 M/UL (ref 3.8–5.2)
RBC # BLD AUTO: NORMAL M/UL (ref 3.8–5.2)
RBC #/AREA URNS HPF: ABNORMAL /HPF (ref 0–5)
SAMPLES BEING HELD,HOLD: NORMAL
SODIUM SERPL-SCNC: 138 MMOL/L (ref 136–145)
SP GR UR REFRACTOMETRY: 1.01 (ref 1–1.03)
UA: UC IF INDICATED,UAUC: ABNORMAL
UROBILINOGEN UR QL STRIP.AUTO: 0.2 EU/DL (ref 0.2–1)
WBC # BLD AUTO: 10.8 K/UL (ref 3.6–11)
WBC # BLD AUTO: NORMAL K/UL (ref 3.6–11)
WBC URNS QL MICRO: >100 /HPF (ref 0–4)

## 2019-03-25 PROCEDURE — 87086 URINE CULTURE/COLONY COUNT: CPT

## 2019-03-25 PROCEDURE — 36415 COLL VENOUS BLD VENIPUNCTURE: CPT

## 2019-03-25 PROCEDURE — 87077 CULTURE AEROBIC IDENTIFY: CPT

## 2019-03-25 PROCEDURE — 80048 BASIC METABOLIC PNL TOTAL CA: CPT

## 2019-03-25 PROCEDURE — 81001 URINALYSIS AUTO W/SCOPE: CPT

## 2019-03-25 PROCEDURE — 87186 SC STD MICRODIL/AGAR DIL: CPT

## 2019-03-25 PROCEDURE — 85027 COMPLETE CBC AUTOMATED: CPT

## 2019-03-26 LAB
ANION GAP SERPL CALC-SCNC: 6 MMOL/L (ref 5–15)
BUN SERPL-MCNC: 10 MG/DL (ref 6–20)
BUN/CREAT SERPL: 18 (ref 12–20)
CALCIUM SERPL-MCNC: 9.3 MG/DL (ref 8.5–10.1)
CHLORIDE SERPL-SCNC: 105 MMOL/L (ref 97–108)
CO2 SERPL-SCNC: 27 MMOL/L (ref 21–32)
CREAT SERPL-MCNC: 0.55 MG/DL (ref 0.55–1.02)
ERYTHROCYTE [DISTWIDTH] IN BLOOD BY AUTOMATED COUNT: 11.9 % (ref 11.5–14.5)
GLUCOSE SERPL-MCNC: 88 MG/DL (ref 65–100)
HCT VFR BLD AUTO: 40.3 % (ref 35–47)
HGB BLD-MCNC: 13.4 G/DL (ref 11.5–16)
MCH RBC QN AUTO: 33.6 PG (ref 26–34)
MCHC RBC AUTO-ENTMCNC: 33.3 G/DL (ref 30–36.5)
MCV RBC AUTO: 101 FL (ref 80–99)
NRBC # BLD: 0 K/UL (ref 0–0.01)
NRBC BLD-RTO: 0 PER 100 WBC
PLATELET # BLD AUTO: 248 K/UL (ref 150–400)
PMV BLD AUTO: 9.6 FL (ref 8.9–12.9)
POTASSIUM SERPL-SCNC: 4.2 MMOL/L (ref 3.5–5.1)
RBC # BLD AUTO: 3.99 M/UL (ref 3.8–5.2)
SODIUM SERPL-SCNC: 138 MMOL/L (ref 136–145)
WBC # BLD AUTO: 6.4 K/UL (ref 3.6–11)

## 2019-03-26 PROCEDURE — 85027 COMPLETE CBC AUTOMATED: CPT

## 2019-03-26 PROCEDURE — 36415 COLL VENOUS BLD VENIPUNCTURE: CPT

## 2019-03-26 PROCEDURE — 80048 BASIC METABOLIC PNL TOTAL CA: CPT

## 2019-03-28 LAB
ANION GAP SERPL CALC-SCNC: 5 MMOL/L (ref 5–15)
BUN SERPL-MCNC: 13 MG/DL (ref 6–20)
BUN/CREAT SERPL: 18 (ref 12–20)
CALCIUM SERPL-MCNC: 10.1 MG/DL (ref 8.5–10.1)
CHLORIDE SERPL-SCNC: 108 MMOL/L (ref 97–108)
CO2 SERPL-SCNC: 28 MMOL/L (ref 21–32)
CREAT SERPL-MCNC: 0.71 MG/DL (ref 0.55–1.02)
ERYTHROCYTE [DISTWIDTH] IN BLOOD BY AUTOMATED COUNT: 11.9 % (ref 11.5–14.5)
GLUCOSE SERPL-MCNC: 87 MG/DL (ref 65–100)
HCT VFR BLD AUTO: 40.1 % (ref 35–47)
HGB BLD-MCNC: 13.3 G/DL (ref 11.5–16)
MCH RBC QN AUTO: 33.8 PG (ref 26–34)
MCHC RBC AUTO-ENTMCNC: 33.2 G/DL (ref 30–36.5)
MCV RBC AUTO: 102 FL (ref 80–99)
NRBC # BLD: 0 K/UL (ref 0–0.01)
NRBC BLD-RTO: 0 PER 100 WBC
PLATELET # BLD AUTO: 218 K/UL (ref 150–400)
PMV BLD AUTO: 10 FL (ref 8.9–12.9)
POTASSIUM SERPL-SCNC: 4.3 MMOL/L (ref 3.5–5.1)
RBC # BLD AUTO: 3.93 M/UL (ref 3.8–5.2)
SODIUM SERPL-SCNC: 141 MMOL/L (ref 136–145)
WBC # BLD AUTO: 6 K/UL (ref 3.6–11)

## 2019-03-28 PROCEDURE — 80048 BASIC METABOLIC PNL TOTAL CA: CPT

## 2019-03-28 PROCEDURE — 36415 COLL VENOUS BLD VENIPUNCTURE: CPT

## 2019-03-28 PROCEDURE — 85027 COMPLETE CBC AUTOMATED: CPT

## 2019-03-29 LAB
BACTERIA SPEC CULT: ABNORMAL
CC UR VC: ABNORMAL
SERVICE CMNT-IMP: ABNORMAL

## 2019-04-01 LAB
ANION GAP SERPL CALC-SCNC: 5 MMOL/L (ref 5–15)
BUN SERPL-MCNC: 13 MG/DL (ref 6–20)
BUN/CREAT SERPL: 18 (ref 12–20)
CALCIUM SERPL-MCNC: 10.2 MG/DL (ref 8.5–10.1)
CHLORIDE SERPL-SCNC: 109 MMOL/L (ref 97–108)
CO2 SERPL-SCNC: 28 MMOL/L (ref 21–32)
CREAT SERPL-MCNC: 0.72 MG/DL (ref 0.55–1.02)
ERYTHROCYTE [DISTWIDTH] IN BLOOD BY AUTOMATED COUNT: 11.9 % (ref 11.5–14.5)
GLUCOSE SERPL-MCNC: 76 MG/DL (ref 65–100)
HCT VFR BLD AUTO: 37.7 % (ref 35–47)
HGB BLD-MCNC: 12.4 G/DL (ref 11.5–16)
MCH RBC QN AUTO: 34 PG (ref 26–34)
MCHC RBC AUTO-ENTMCNC: 32.9 G/DL (ref 30–36.5)
MCV RBC AUTO: 103.3 FL (ref 80–99)
NRBC # BLD: 0 K/UL (ref 0–0.01)
NRBC BLD-RTO: 0 PER 100 WBC
PLATELET # BLD AUTO: 212 K/UL (ref 150–400)
PMV BLD AUTO: 9.9 FL (ref 8.9–12.9)
POTASSIUM SERPL-SCNC: 4.2 MMOL/L (ref 3.5–5.1)
RBC # BLD AUTO: 3.65 M/UL (ref 3.8–5.2)
SODIUM SERPL-SCNC: 142 MMOL/L (ref 136–145)
WBC # BLD AUTO: 5.1 K/UL (ref 3.6–11)

## 2019-04-01 PROCEDURE — 85027 COMPLETE CBC AUTOMATED: CPT

## 2019-04-01 PROCEDURE — 80048 BASIC METABOLIC PNL TOTAL CA: CPT

## 2019-04-01 PROCEDURE — 36415 COLL VENOUS BLD VENIPUNCTURE: CPT

## 2019-04-04 LAB
ANION GAP SERPL CALC-SCNC: 3 MMOL/L (ref 5–15)
BUN SERPL-MCNC: 13 MG/DL (ref 6–20)
BUN/CREAT SERPL: 18 (ref 12–20)
CALCIUM SERPL-MCNC: 10 MG/DL (ref 8.5–10.1)
CHLORIDE SERPL-SCNC: 110 MMOL/L (ref 97–108)
CO2 SERPL-SCNC: 31 MMOL/L (ref 21–32)
CREAT SERPL-MCNC: 0.72 MG/DL (ref 0.55–1.02)
ERYTHROCYTE [DISTWIDTH] IN BLOOD BY AUTOMATED COUNT: 11.8 % (ref 11.5–14.5)
FOLATE SERPL-MCNC: 23.6 NG/ML (ref 5–21)
GLUCOSE SERPL-MCNC: 78 MG/DL (ref 65–100)
HCT VFR BLD AUTO: 39.6 % (ref 35–47)
HGB BLD-MCNC: 12.9 G/DL (ref 11.5–16)
MCH RBC QN AUTO: 33.5 PG (ref 26–34)
MCHC RBC AUTO-ENTMCNC: 32.6 G/DL (ref 30–36.5)
MCV RBC AUTO: 102.9 FL (ref 80–99)
NRBC # BLD: 0 K/UL (ref 0–0.01)
NRBC BLD-RTO: 0 PER 100 WBC
PLATELET # BLD AUTO: 207 K/UL (ref 150–400)
PMV BLD AUTO: 10 FL (ref 8.9–12.9)
POTASSIUM SERPL-SCNC: 3.9 MMOL/L (ref 3.5–5.1)
RBC # BLD AUTO: 3.85 M/UL (ref 3.8–5.2)
SODIUM SERPL-SCNC: 144 MMOL/L (ref 136–145)
VIT B12 SERPL-MCNC: 999 PG/ML (ref 193–986)
WBC # BLD AUTO: 4 K/UL (ref 3.6–11)

## 2019-04-04 PROCEDURE — 82746 ASSAY OF FOLIC ACID SERUM: CPT

## 2019-04-04 PROCEDURE — 36415 COLL VENOUS BLD VENIPUNCTURE: CPT

## 2019-04-04 PROCEDURE — 85027 COMPLETE CBC AUTOMATED: CPT

## 2019-04-04 PROCEDURE — 80048 BASIC METABOLIC PNL TOTAL CA: CPT

## 2019-04-04 PROCEDURE — 82607 VITAMIN B-12: CPT

## 2019-09-19 ENCOUNTER — HOSPITAL ENCOUNTER (OUTPATIENT)
Dept: MRI IMAGING | Age: 79
Discharge: HOME | End: 2019-09-19
Attending: SPECIALIST
Payer: MEDICARE

## 2019-09-19 VITALS — BODY MASS INDEX: 19.26 KG/M2 | HEIGHT: 61 IN | WEIGHT: 102 LBS

## 2019-09-19 DIAGNOSIS — I61.9 ICH (INTRACEREBRAL HEMORRHAGE) (HCC): ICD-10-CM

## 2019-09-19 PROCEDURE — 70553 MRI BRAIN STEM W/O & W/DYE: CPT

## 2019-09-19 PROCEDURE — 74011250636 HC RX REV CODE- 250/636: Performed by: SPECIALIST

## 2019-09-19 PROCEDURE — A9575 INJ GADOTERATE MEGLUMI 0.1ML: HCPCS | Performed by: SPECIALIST

## 2019-09-19 RX ORDER — GADOTERATE MEGLUMINE 376.9 MG/ML
9 INJECTION INTRAVENOUS
Status: COMPLETED | OUTPATIENT
Start: 2019-09-19 | End: 2019-09-19

## 2019-09-19 RX ADMIN — GADOTERATE MEGLUMINE 9 ML: 376.9 INJECTION INTRAVENOUS at 14:16

## 2020-05-21 ENCOUNTER — HOSPITAL ENCOUNTER (OUTPATIENT)
Dept: MRI IMAGING | Age: 80
Discharge: HOME | End: 2020-05-21
Attending: FAMILY MEDICINE
Payer: MEDICARE

## 2020-05-21 DIAGNOSIS — R41.82 ALTERED MENTAL STATUS: ICD-10-CM

## 2020-05-21 PROCEDURE — 70551 MRI BRAIN STEM W/O DYE: CPT

## 2020-08-26 NOTE — DISCHARGE INSTRUCTIONS
HOSPITALIST DISCHARGE INSTRUCTIONS    NAME:             Marialusia Peralta   :  1940   MRN:  [id number]     Date:     2018    ADMIT DATE: 2018     DISCHARGE DATE: 2018     PRINCIPAL ADMITTING DIAGNOSIS:  COPD exacerbation ([facility]    DISCHARGE DIAGNOSES:  Principal Problem:    COPD exacerbation ([facility] (2018)    Tobacco abuse (2018)    Hypoxia (2018)    Leukopenia (2/10/2018)    Thrombocytopenia ([facility] (2/10/2018)    MEDICATIONS:    · It is important that medications are taken exactly as they are prescribed on the discharge medication instructions and keep them your  in the bottles provided by the pharmacist.   · Keep a list of the medication names, dosages, and times to be taken at all times. · Do not take other medications without consulting your doctor. Recommended diet:  Regular Diet    Recommended activity: Activity as tolerated    Post discharge care:    Notify follow up health care provider or return to the emergency department if you cannot get hold of your doctor if you feel worse or experience symptoms similar to those that brought you to hospital    Follow-up Information     Follow up With Details Comments Contact Info    Sandy Mullen MD Schedule an appointment as soon as possible for a visit for review [facility] [address]  [phone]            Information obtained by :  I understand that if any problems occur once I am at home I am to contact my physician and I understand and acknowledge receipt of the instructions indicated above.                                                                                                                                            Physician's or R.N.'s Signature                                                                  Date/Time                                                                                                                                              Patient or Representative Signature                                                          Date/Time 65.7 72.5

## 2022-03-18 PROBLEM — D72.819 LEUKOPENIA: Status: ACTIVE | Noted: 2018-02-10

## 2022-03-18 PROBLEM — D69.6 THROMBOCYTOPENIA (HCC): Status: ACTIVE | Noted: 2018-02-10

## 2022-03-19 PROBLEM — R09.02 HYPOXIA: Status: ACTIVE | Noted: 2018-02-09

## 2022-03-19 PROBLEM — J44.1 COPD EXACERBATION (HCC): Status: ACTIVE | Noted: 2018-02-09

## 2022-03-19 PROBLEM — I61.9 ICH (INTRACEREBRAL HEMORRHAGE) (HCC): Status: ACTIVE | Noted: 2019-03-16

## 2022-03-20 PROBLEM — Z72.0 TOBACCO ABUSE: Status: ACTIVE | Noted: 2018-02-09

## 2023-05-25 RX ORDER — AMLODIPINE BESYLATE 5 MG/1
TABLET ORAL DAILY
COMMUNITY
Start: 2019-03-22

## 2023-05-25 RX ORDER — ZOLPIDEM TARTRATE 5 MG/1
TABLET ORAL
COMMUNITY
Start: 2019-02-27

## 2023-05-25 RX ORDER — POTASSIUM CHLORIDE 20 MEQ/1
TABLET, EXTENDED RELEASE ORAL DAILY
COMMUNITY
Start: 2019-03-22

## 2023-05-25 RX ORDER — MAGNESIUM GLUCONATE 30 MG(550)
TABLET ORAL
COMMUNITY

## 2023-05-25 RX ORDER — ALBUTEROL SULFATE 90 UG/1
2 AEROSOL, METERED RESPIRATORY (INHALATION) EVERY 4 HOURS PRN
COMMUNITY